# Patient Record
Sex: FEMALE | Race: WHITE | NOT HISPANIC OR LATINO | Employment: FULL TIME | URBAN - METROPOLITAN AREA
[De-identification: names, ages, dates, MRNs, and addresses within clinical notes are randomized per-mention and may not be internally consistent; named-entity substitution may affect disease eponyms.]

---

## 2017-01-03 ENCOUNTER — OFFICE VISIT (OUTPATIENT)
Dept: FAMILY MEDICINE | Facility: CLINIC | Age: 62
End: 2017-01-03
Payer: COMMERCIAL

## 2017-01-03 VITALS
TEMPERATURE: 98 F | HEIGHT: 62 IN | DIASTOLIC BLOOD PRESSURE: 78 MMHG | SYSTOLIC BLOOD PRESSURE: 117 MMHG | WEIGHT: 168.44 LBS | HEART RATE: 71 BPM | BODY MASS INDEX: 31 KG/M2

## 2017-01-03 DIAGNOSIS — J30.1 ALLERGIC RHINITIS DUE TO POLLEN, UNSPECIFIED RHINITIS SEASONALITY: ICD-10-CM

## 2017-01-03 DIAGNOSIS — Z11.59 NEED FOR HEPATITIS C SCREENING TEST: ICD-10-CM

## 2017-01-03 DIAGNOSIS — I10 ESSENTIAL HYPERTENSION: ICD-10-CM

## 2017-01-03 DIAGNOSIS — R06.2 WHEEZING ON AUSCULTATION: ICD-10-CM

## 2017-01-03 DIAGNOSIS — Z00.00 ANNUAL PHYSICAL EXAM: Primary | ICD-10-CM

## 2017-01-03 DIAGNOSIS — E78.5 HYPERLIPIDEMIA, UNSPECIFIED HYPERLIPIDEMIA TYPE: ICD-10-CM

## 2017-01-03 DIAGNOSIS — E03.9 HYPOTHYROIDISM, UNSPECIFIED TYPE: ICD-10-CM

## 2017-01-03 PROCEDURE — 99999 PR PBB SHADOW E&M-EST. PATIENT-LVL III: CPT | Mod: PBBFAC,,, | Performed by: PHYSICIAN ASSISTANT

## 2017-01-03 PROCEDURE — 90471 IMMUNIZATION ADMIN: CPT | Mod: S$GLB,,, | Performed by: INTERNAL MEDICINE

## 2017-01-03 PROCEDURE — 90715 TDAP VACCINE 7 YRS/> IM: CPT | Mod: S$GLB,,, | Performed by: PHYSICIAN ASSISTANT

## 2017-01-03 PROCEDURE — 3074F SYST BP LT 130 MM HG: CPT | Mod: S$GLB,,, | Performed by: PHYSICIAN ASSISTANT

## 2017-01-03 PROCEDURE — 90686 IIV4 VACC NO PRSV 0.5 ML IM: CPT | Mod: S$GLB,,, | Performed by: INTERNAL MEDICINE

## 2017-01-03 PROCEDURE — 3078F DIAST BP <80 MM HG: CPT | Mod: S$GLB,,, | Performed by: PHYSICIAN ASSISTANT

## 2017-01-03 PROCEDURE — 90472 IMMUNIZATION ADMIN EACH ADD: CPT | Mod: S$GLB,,, | Performed by: PHYSICIAN ASSISTANT

## 2017-01-03 PROCEDURE — 99396 PREV VISIT EST AGE 40-64: CPT | Mod: 25,S$GLB,, | Performed by: PHYSICIAN ASSISTANT

## 2017-01-03 RX ORDER — FLUTICASONE PROPIONATE 50 MCG
2 SPRAY, SUSPENSION (ML) NASAL DAILY
Qty: 16 G | Refills: 11 | Status: SHIPPED | OUTPATIENT
Start: 2017-01-03 | End: 2019-10-03

## 2017-01-03 RX ORDER — ALBUTEROL SULFATE 90 UG/1
2 AEROSOL, METERED RESPIRATORY (INHALATION) EVERY 6 HOURS PRN
Qty: 1 INHALER | Refills: 11 | Status: SHIPPED | OUTPATIENT
Start: 2017-01-03 | End: 2019-03-14

## 2017-01-03 RX ORDER — AZELASTINE 1 MG/ML
1 SPRAY, METERED NASAL 2 TIMES DAILY
Qty: 30 ML | Refills: 11 | Status: SHIPPED | OUTPATIENT
Start: 2017-01-03 | End: 2019-10-03

## 2017-01-03 RX ORDER — MOEXIPRIL HYDROCHLORIDE 7.5 MG/1
7.5 TABLET, FILM COATED ORAL DAILY
Qty: 90 TABLET | Refills: 3 | Status: SHIPPED | OUTPATIENT
Start: 2017-01-03 | End: 2017-12-28 | Stop reason: SDUPTHER

## 2017-01-03 RX ORDER — LEVOTHYROXINE SODIUM 25 UG/1
25 TABLET ORAL
Qty: 90 TABLET | Refills: 3 | Status: SHIPPED | OUTPATIENT
Start: 2017-01-03 | End: 2017-03-05 | Stop reason: SDUPTHER

## 2017-01-03 RX ORDER — PRAVASTATIN SODIUM 20 MG/1
20 TABLET ORAL DAILY
Qty: 30 TABLET | Refills: 5 | Status: SHIPPED | OUTPATIENT
Start: 2017-01-03 | End: 2017-02-10 | Stop reason: SDUPTHER

## 2017-01-03 NOTE — MR AVS SNAPSHOT
[facility]  [address]  [place] [address]  Phone: [phone]  Fax: [phone]                  Elodia Serrano   1/3/2017 9:00 AM   Office Visit    Description:  Female : 1955   Provider:  TESSA Wiggins   Department:  [facility]           Reason for Visit     Annual Exam           Diagnoses this Visit        Comments    Annual physical exam    -  Primary     Essential hypertension         Hyperlipidemia, unspecified hyperlipidemia type         Allergic rhinitis due to pollen, unspecified rhinitis seasonality         Hypothyroidism, unspecified type         Wheezing on auscultation         Need for hepatitis C screening test         BMI 30.0-30.9,adult                To Do List           Future Appointments        Provider Department Dept Phone    2017 9:00 AM Jerardo Hedrick Jr., MD [facility] [phone]      Goals (5 Years of Data)     None      Follow-Up and Disposition     Return for Follow-Up with PCP 6 mo .       These Medications        Disp Refills Start End    moexipril (UNIVASC) 7.5 MG Tab 90 tablet 3 1/3/2017     Take 1 tablet (7.5 mg total) by mouth once daily. - Oral    Pharmacy: [facility]/pharmacy #[id number] - [place], [place] - [address] AT [facility] Ph #: [phone]       levothyroxine (SYNTHROID) 25 MCG tablet 90 tablet 3 1/3/2017     Take 1 tablet (25 mcg total) by mouth before breakfast. For thyroid - Oral    Pharmacy: [facility]/pharmacy #[id number] - [place], [place] - [address] AT [facility] Ph #: [phone]       pravastatin (PRAVACHOL) 20 MG tablet 30 tablet 5 1/3/2017     Take 1 tablet (20 mg total) by mouth once daily. - Oral    Pharmacy: [facility]/pharmacy #[id number] - [place], [place] - [address] AT [facility] Ph #: [phone]       fluticasone (FLONASE) 50 mcg/actuation nasal spray 16 g 11 1/3/2017     2 sprays by Each Nare route once daily. - Each Nare     [place] [id number] - [place] - [address] AT [facility] Ph #: [phone]       azelastine (ASTELIN) 137 mcg (0.1 %) nasal spray 30 mL 11 1/3/2017     1 spray (137 mcg total) by Nasal route 2 (two) times daily. - Nasal    Pharmacy: [id number] - [place], [place] - [address] AT [facility] Ph #: [phone]       albuterol 90 mcg/actuation inhaler 1 Inhaler 11 1/3/2017 1/3/2018    Inhale 2 puffs into the lungs every 6 (six) hours as needed (sob, wheezing). - Inhalation    Pharmacy: [id number] - [place], [place] - [address] AT [facility] Ph #: [phone]         PURCHASE these Medications (No prescription required)        Start End    calcium carbonate (CALCIUM ANTACID) 300 mg (750 mg) Chew 1/3/2017     Sig - Route: Take 2 tablets by mouth once daily. chewables - Oral    Class: OTC      [facility]     [facility] On Call Nurse Care Line - 24/7 Assistance  Registered nurses in the [facility] provide clinical advisement, health education, appointment booking, and other advisory services.  Call for this free service at [phone].             Medications           Message regarding Medications     Verify the changes and/or additions to your medication regime listed below are the same as discussed with your clinician today.  If any of these changes or additions are incorrect, please notify your healthcare provider.        CHANGE how you are taking these medications     Start Taking Instead of    pravastatin (PRAVACHOL) 20 MG tablet pravastatin (PRAVACHOL) 20 MG tablet    Dosage:  Take 1 tablet (20 mg total) by mouth once daily. Dosage:  TAKE 1 TABLET BY MOUTH ONCE DAILY.    Reason for Change:  Reorder     calcium carbonate (CALCIUM ANTACID) 300 mg (750 mg) Chew calcium carbonate (CALCIUM CARBONATE) 300 mg (750 mg) Chew    Dosage:  Take 2 tablets by mouth once daily. chewables Dosage:  Take 2 tablets by mouth once  "daily. chewables    Reason for Change:  Reorder            Verify that the below list of medications is an accurate representation of the medications you are currently taking.  If none reported, the list may be blank. If incorrect, please contact your healthcare provider. Carry this list with you in case of emergency.           Current Medications     calcium carbonate (CALCIUM ANTACID) 300 mg (750 mg) Chew Take 2 tablets by mouth once daily. chewables    fluticasone (FLONASE) 50 mcg/actuation nasal spray 2 sprays by Each Nare route once daily.    levothyroxine (SYNTHROID) 25 MCG tablet Take 1 tablet (25 mcg total) by mouth before breakfast. For thyroid    moexipril (UNIVASC) 7.5 MG Tab Take 1 tablet (7.5 mg total) by mouth once daily.    multivitamin (MULTIVITAMIN) per tablet Take 1 tablet by mouth once daily. Women over 50 vitamin    pravastatin (PRAVACHOL) 20 MG tablet Take 1 tablet (20 mg total) by mouth once daily.    albuterol 90 mcg/actuation inhaler Inhale 2 puffs into the lungs every 6 (six) hours as needed (sob, wheezing).    azelastine (ASTELIN) 137 mcg (0.1 %) nasal spray 1 spray (137 mcg total) by Nasal route 2 (two) times daily.           Clinical Reference Information           Vital Signs - Last Recorded  Most recent update: 1/3/2017  9:16 AM by Ambar Momin MA    BP Pulse Temp Ht Wt BMI    117/78 (BP Location: Left arm, Patient Position: Sitting, BP Method: Automatic) 71 98.2 °F (36.8 °C) (Oral) 5' 2" (1.575 m) 76.4 kg (168 lb 6.9 oz) 30.81 kg/m2      Blood Pressure          Most Recent Value    BP  117/78      Allergies as of 1/3/2017     No Known Drug Allergies      Immunizations Administered on Date of Encounter - 1/3/2017     Name Date Dose VIS Date Route    TDAP  Incomplete 0.5 mL 2/24/2015 Intramuscular      Orders Placed During Today's Visit      Normal Orders This Visit    CBC auto differential     Comprehensive metabolic panel     Hepatitis C antibody     Lipid panel     Tdap Vaccine " (Adult)     TSH     Urinalysis     Future Labs/Procedures Expected by Expires    CBC auto differential  1/3/2017 1/3/2018    Comprehensive metabolic panel  1/3/2017 3/4/2018    Hepatitis C antibody  1/3/2017 3/4/2018    Lipid panel  1/3/2017 3/4/2018    TSH  1/3/2017 3/4/2018    Urinalysis  1/3/2017 3/4/2018      [facility] Sign-Up     Activating your [facility] account is as easy as 1-2-3!     1) Visit [email or web address], select Sign Up Now, enter this activation code and your date of birth, then select Next.  [email or web address]  Expires: 2/17/2017  9:58 AM      2) Create a username and password to use when you visit [facility] in the future and select a security question in case you lose your password and select Next.    3) Enter your e-mail address and click Sign Up!    Additional Information  If you have questions, please e-mail [email or web address] or call [phone] to talk to our [facility] staff. Remember, [facility] is NOT to be used for urgent needs. For medical emergencies, dial 911.         Instructions      Controlling High Blood Pressure  High blood pressure (hypertension) is often called the silent killer. This is because many people who have it dont know it. High blood pressure is defined as 140/90 mm Hg or higher. Know your blood pressure and remember to check it regularly. Doing so can save your life. Here are some things you can do to help control your blood pressure.    Choose heart-healthy foods  · Select low-salt, low-fat foods. Limit sodium intake to 2,400 mg per day or the amount suggested by your healthcare provider.  · Limit canned, dried, cured, packaged, and fast foods. These can contain a lot of salt.  · Eat 8 to 10 servings of fruits and vegetables every day.  · Choose lean meats, fish, or chicken.  · Eat whole-grain pasta, brown rice, and beans.  · Eat 2 to 3 servings of low-fat or fat-free dairy products.  · Ask your doctor about the DASH eating plan. This plan helps reduce blood  pressure.  · When you go to a restaurant, ask that your meal be prepared with no added salt.  Maintain a healthy weight  · Ask your healthcare provider how many calories to eat a day. Then stick to that number.  · Ask your healthcare provider what weight range is healthiest for you. If you are overweight, a weight loss of only 3% to 5% of your body weight can help lower blood pressure. Generally, a good weight loss goal is to lose 10% of your body weight in a year.  · Limit snacks and sweets.  · Get regular exercise.  Get up and get active  · Choose activities you enjoy. Find ones you can do with friends or family. This includes bicycling, dancing, walking, and jogging.  · Park farther away from building entrances.  · Use stairs instead of the elevator.  · When you can, walk or bike instead of driving.  · [place] leaves, garden, or do household repairs.  · Be active at a moderate to vigorous level of physical activity for at least 40 minutes for a minimum of 3 to 4 days a week.   Manage stress  · Make time to relax and enjoy life. Find time to laugh.  · Communicate your concerns with your loved ones and your healthcare provider.  · Visit with family and friends, and keep up with hobbies.  Limit alcohol and quit smoking  · Men should have no more than 2 drinks per day.  · Women should have no more than 1 drink per day.  · Talk with your healthcare provider about quitting smoking. Smoking significantly increases your risk for heart disease and stroke. Ask your healthcare provider about community smoking cessation programs and other options.  Medicines  If lifestyle changes arent enough, your healthcare provider may prescribe high blood pressure medicine. Take all medicines as prescribed. If you have any questions about your medicines, ask your healthcare provider before stopping or changing them.   © 2762-9123 [employer]. [address] [place] [address], [place] [address]. All rights reserved. This information is  not intended as a substitute for professional medical care. Always follow your healthcare professional's instructions.        Walking for Fitness  Fitness walking has something for everyone, even people who are already fit. Walking is one of the safest ways to condition your body aerobically. It can boost energy, help you lose weight, and reduce stress.    Physical benefits  · Walking strengthens your heart and lungs, and tones your muscles.  · When walking, your feet land with less impact than in other sports. This reduces chances of muscle, bone, and joint injury.  · Regular walking improves your cholesterol levels and lowers your risk of heart disease. And it helps you control your blood sugar if you have diabetes.  · Walking is a weight-bearing activity, which helps maintain bone density. This can help prevent osteoporosis.  Personal rewards  · Taking walks can help you relax and manage stress. And fitness walking may make you feel better about yourself.  · Walking can help you sleep better at night and make you less likely to be depressed.  · Regular walking may help maintain your memory as you get older.  · Walking is a great way to spend extra time with friends and family members. Be sure to invite your dog along!  Q&A about fitness walking  Q: Will walking keep me fit?  A: Yes. Regular walking at the right pace gives you all the benefits of other aerobic activities, such as jogging and swimming.  Q: Will walking help me lose weight and keep it off?  A: Yes. Per mile, walking can burn as many calories as jogging. Your health care provider can help work walking into your weight-loss plan.  Q: Is walking safe for my health?  A: Yes. Walking is safe if you have high blood pressure, diabetes, heart disease, or other conditions. Talk to your health care provider before you start.  © 7842-0851 [employer]. [address] [place] [address], [place] [address]. All rights reserved. This information is not intended  as a substitute for professional medical care. Always follow your healthcare professional's instructions.        Weight Management: Getting Started  Healthy bodies come in all shapes and sizes. Not all bodies are made to be thin. For some people, a healthy weight is higher than the average weight listed on weight charts. Your healthcare provider can help you decide on a healthy weight for you.    Reasons to lose weight  Losing weight can help with some health problems, such as high blood pressure, heart disease, diabetes, sleep apnea, and arthritis. You may also feel more energy.  Set your long-term goal  Your goal doesn't even have to be a specific weight. You may decide on a fitness goal (such as being able to walk 10 miles a week), or a health goal (such as lowering your blood pressure). Choose a goal that is measurable and reasonable, so you know when you've reached it. A goal of reaching a BMI of less than 25 is not always reasonable (or possible).   Make an action plan  Habits dont change overnight. Setting your goals too high can leave you feeling discouraged if you cant reach them. Be realistic. Choose one or two small changes you can make now. Set an action plan for how you are going to make these changes. When you can stick to this plan, keep making a few more small changes. Taking small steps will help you stay on the path to success.  Track your progress  Write down your goals. Then, keep a daily record of your progress. Write down what you eat and how active you are. This record lets you look back on how much youve done. It may also help when youre feeling frustrated. Reward yourself for success. Even if you dont reach every goal, give yourself credit for what you do get done.  Get support  Encouragement from others can help make losing weight easier. Ask your family members and friends for support. They may even want to join you. Also look to your healthcare provider, registered dietitian, and   for help. Your local hospital can give you more information about nutrition, exercise, and weight loss.  © 2959-2389 [employer]. [address] [place] [address], [place] [address]. All rights reserved. This information is not intended as a substitute for professional medical care. Always follow your healthcare professional's instructions.

## 2017-01-03 NOTE — PROGRESS NOTES
Subjective:       Patient ID: Elodia Serrano is a 61 y.o. female.    Chief Complaint: Annual Exam    HPI Comments: Patient presents for annual exam.  She has controlled hypertension controlled with ace inhibitor.  .  She take statin for hyperlipidemia and levothyroxine for hypothyroidism.  She is due for lab.  She has allergic rhinitis and allergy induced asthma.  Symptoms are well controlled with intranasal steroid and antihistamine.  She uses albuterol rarely for wheezing.     Review of Systems   Constitutional: Negative for appetite change, fatigue and unexpected weight change.   Eyes: Positive for visual disturbance.   Respiratory: Negative for cough, shortness of breath and wheezing.    Cardiovascular: Negative for chest pain, palpitations and leg swelling.   Gastrointestinal: Negative for abdominal distention, abdominal pain, anal bleeding, blood in stool, constipation and diarrhea.   Endocrine: Negative for polydipsia and polyuria.   Neurological: Negative for dizziness, light-headedness and headaches.       Objective:      Physical Exam   Constitutional: She appears well-developed and well-nourished. She is cooperative. No distress.   Eyes: Conjunctivae and EOM are normal. Pupils are equal, round, and reactive to light. No scleral icterus.   Cardiovascular: Normal rate, regular rhythm and normal heart sounds.    Pulmonary/Chest: Effort normal and breath sounds normal.   Abdominal: Soft. Bowel sounds are normal.   Musculoskeletal:        Right lower leg: She exhibits no edema.        Left lower leg: She exhibits no edema.   Neurological: She is alert.   Skin: Skin is warm and dry.   Psychiatric: She has a normal mood and affect. Her speech is normal. Judgment normal. Cognition and memory are normal.   Vitals reviewed.      Assessment:       1. Annual physical exam    2. Essential hypertension    3. Hyperlipidemia, unspecified hyperlipidemia type    4. Allergic rhinitis due to pollen, unspecified rhinitis  seasonality    5. Hypothyroidism, unspecified type    6. wheezing    7. Need for hepatitis C screening test    8. BMI 30.0-30.9,adult        Plan:       Elodia was seen today for annual exam.    Diagnoses and all orders for this visit:    Annual exam   She has had colonoscopy with Dr Norman this year.  Request copy for  update  PAP, WWE and mammogram per Dr Dale   Continue healthy diet and exercise  Up date vaccines and lab today    Essential hypertension  -     moexipril (UNIVASC) 7.5 MG Tab; Take 1 tablet (7.5 mg total) by mouth once daily.  -     Comprehensive metabolic panel; Future  -     Lipid panel; Future  -     CBC auto differential; Future  -     Urinalysis; Future  -     Hyperlipidemia, unspecified hyperlipidemia type  -     pravastatin (PRAVACHOL) 20 MG tablet; Take 1 tablet (20 mg total) by mouth once daily.  -     Lipid panel; Future  -    Allergic rhinitis due to pollen, unspecified rhinitis seasonality  -     azelastine (ASTELIN) 137 mcg (0.1 %) nasal spray; 1 spray (137 mcg total) by Nasal route 2 (two) times daily.    fluticasone (FLONASE) 50 mcg/actuation nasal spray; 2 sprays by Each Nare route once daily.    Hypothyroidism, unspecified type  -     -     TSH       levothyroxine (SYNTHROID) 25 MCG tablet; Take 1 tablet (25 mcg total) by mouth before breakfast. For thyroid    wheezing  -     albuterol 90 mcg/actuation inhaler; Inhale 2 puffs into the lungs every 6 (six) hours as needed (sob, wheezing).    Need for hepatitis C screening test  -     Hepatitis C antibody; Future  -    BMI 30.0-30.9,adult  Patient readiness: acceptance and barriers:none    During the course of the visit the patient was educated and counseled about the following:     Hypertension:   Medication: no change.  Obesity:   General weight loss/lifestyle modification strategies discussed (elicit support from others; identify saboteurs; non-food rewards, etc).    Goals: Hypertension: Reduce Blood Pressure and Obesity:  Reduce calorie intake and BMI    Did patient meet goals/outcomes: Yes    The following self management tools provided: declined    Patient Instructions (the written plan) was given to the patient/family.     Time spent with patient: 30 minutes            Other orders  -     Tdap Vaccine (Adult)  --   -     calcium carbonate (CALCIUM ANTACID) 300 mg (750 mg) Chew; Take 2 tablets by mouth once daily. chewables

## 2017-01-03 NOTE — PATIENT INSTRUCTIONS
Controlling High Blood Pressure  High blood pressure (hypertension) is often called the silent killer. This is because many people who have it dont know it. High blood pressure is defined as 140/90 mm Hg or higher. Know your blood pressure and remember to check it regularly. Doing so can save your life. Here are some things you can do to help control your blood pressure.    Choose heart-healthy foods  · Select low-salt, low-fat foods. Limit sodium intake to 2,400 mg per day or the amount suggested by your healthcare provider.  · Limit canned, dried, cured, packaged, and fast foods. These can contain a lot of salt.  · Eat 8 to 10 servings of fruits and vegetables every day.  · Choose lean meats, fish, or chicken.  · Eat whole-grain pasta, brown rice, and beans.  · Eat 2 to 3 servings of low-fat or fat-free dairy products.  · Ask your doctor about the DASH eating plan. This plan helps reduce blood pressure.  · When you go to a restaurant, ask that your meal be prepared with no added salt.  Maintain a healthy weight  · Ask your healthcare provider how many calories to eat a day. Then stick to that number.  · Ask your healthcare provider what weight range is healthiest for you. If you are overweight, a weight loss of only 3% to 5% of your body weight can help lower blood pressure. Generally, a good weight loss goal is to lose 10% of your body weight in a year.  · Limit snacks and sweets.  · Get regular exercise.  Get up and get active  · Choose activities you enjoy. Find ones you can do with friends or family. This includes bicycling, dancing, walking, and jogging.  · Park farther away from building entrances.  · Use stairs instead of the elevator.  · When you can, walk or bike instead of driving.  · [place] leaves, garden, or do household repairs.  · Be active at a moderate to vigorous level of physical activity for at least 40 minutes for a minimum of 3 to 4 days a week.   Manage stress  · Make time to relax and enjoy  life. Find time to laugh.  · Communicate your concerns with your loved ones and your healthcare provider.  · Visit with family and friends, and keep up with hobbies.  Limit alcohol and quit smoking  · Men should have no more than 2 drinks per day.  · Women should have no more than 1 drink per day.  · Talk with your healthcare provider about quitting smoking. Smoking significantly increases your risk for heart disease and stroke. Ask your healthcare provider about community smoking cessation programs and other options.  Medicines  If lifestyle changes arent enough, your healthcare provider may prescribe high blood pressure medicine. Take all medicines as prescribed. If you have any questions about your medicines, ask your healthcare provider before stopping or changing them.   © 4422-0925 [employer]. [address] [place] [address], [place] [address]. All rights reserved. This information is not intended as a substitute for professional medical care. Always follow your healthcare professional's instructions.        Walking for Fitness  Fitness walking has something for everyone, even people who are already fit. Walking is one of the safest ways to condition your body aerobically. It can boost energy, help you lose weight, and reduce stress.    Physical benefits  · Walking strengthens your heart and lungs, and tones your muscles.  · When walking, your feet land with less impact than in other sports. This reduces chances of muscle, bone, and joint injury.  · Regular walking improves your cholesterol levels and lowers your risk of heart disease. And it helps you control your blood sugar if you have diabetes.  · Walking is a weight-bearing activity, which helps maintain bone density. This can help prevent osteoporosis.  Personal rewards  · Taking walks can help you relax and manage stress. And fitness walking may make you feel better about yourself.  · Walking can help you sleep better at night and make you less  likely to be depressed.  · Regular walking may help maintain your memory as you get older.  · Walking is a great way to spend extra time with friends and family members. Be sure to invite your dog along!  Q&A about fitness walking  Q: Will walking keep me fit?  A: Yes. Regular walking at the right pace gives you all the benefits of other aerobic activities, such as jogging and swimming.  Q: Will walking help me lose weight and keep it off?  A: Yes. Per mile, walking can burn as many calories as jogging. Your health care provider can help work walking into your weight-loss plan.  Q: Is walking safe for my health?  A: Yes. Walking is safe if you have high blood pressure, diabetes, heart disease, or other conditions. Talk to your health care provider before you start.  © 2177-3256 [employer]. [address] [place] [address]. All rights reserved. This information is not intended as a substitute for professional medical care. Always follow your healthcare professional's instructions.        Weight Management: Getting Started  Healthy bodies come in all shapes and sizes. Not all bodies are made to be thin. For some people, a healthy weight is higher than the average weight listed on weight charts. Your healthcare provider can help you decide on a healthy weight for you.    Reasons to lose weight  Losing weight can help with some health problems, such as high blood pressure, heart disease, diabetes, sleep apnea, and arthritis. You may also feel more energy.  Set your long-term goal  Your goal doesn't even have to be a specific weight. You may decide on a fitness goal (such as being able to walk 10 miles a week), or a health goal (such as lowering your blood pressure). Choose a goal that is measurable and reasonable, so you know when you've reached it. A goal of reaching a BMI of less than 25 is not always reasonable (or possible).   Make an action plan  Habits dont change overnight. Setting your  goals too high can leave you feeling discouraged if you cant reach them. Be realistic. Choose one or two small changes you can make now. Set an action plan for how you are going to make these changes. When you can stick to this plan, keep making a few more small changes. Taking small steps will help you stay on the path to success.  Track your progress  Write down your goals. Then, keep a daily record of your progress. Write down what you eat and how active you are. This record lets you look back on how much youve done. It may also help when youre feeling frustrated. Reward yourself for success. Even if you dont reach every goal, give yourself credit for what you do get done.  Get support  Encouragement from others can help make losing weight easier. Ask your family members and friends for support. They may even want to join you. Also look to your healthcare provider, registered dietitian, and  for help. Your local hospital can give you more information about nutrition, exercise, and weight loss.  © 4834-1122 [employer]. [address] [place] [address], [place] [address]. All rights reserved. This information is not intended as a substitute for professional medical care. Always follow your healthcare professional's instructions.

## 2017-02-10 DIAGNOSIS — E78.5 HYPERLIPIDEMIA, UNSPECIFIED HYPERLIPIDEMIA TYPE: ICD-10-CM

## 2017-02-10 RX ORDER — PRAVASTATIN SODIUM 20 MG/1
20 TABLET ORAL DAILY
Qty: 90 TABLET | Refills: 3 | Status: SHIPPED | OUTPATIENT
Start: 2017-02-10 | End: 2017-03-14

## 2017-02-10 NOTE — TELEPHONE ENCOUNTER
----- Message from Yaritza Parham sent at 2/10/2017  1:06 PM CST -----  [facility]  Requesting 90 day supply on Prevastatin. Please call back at     [facility]/pharmacy #[id number] - [place] - [address] AT [facility]  [address]  [place] [address]  Phone: [phone] Fax: [phone]

## 2017-03-05 DIAGNOSIS — E03.9 HYPOTHYROIDISM: ICD-10-CM

## 2017-03-05 RX ORDER — LEVOTHYROXINE SODIUM 25 UG/1
TABLET ORAL
Qty: 90 TABLET | Refills: 0 | Status: SHIPPED | OUTPATIENT
Start: 2017-03-05 | End: 2017-06-23 | Stop reason: SDUPTHER

## 2017-03-10 ENCOUNTER — TELEPHONE (OUTPATIENT)
Dept: FAMILY MEDICINE | Facility: CLINIC | Age: 62
End: 2017-03-10

## 2017-03-10 DIAGNOSIS — R73.9 HYPERGLYCEMIA: Primary | ICD-10-CM

## 2017-03-10 DIAGNOSIS — E78.5 HYPERLIPIDEMIA, UNSPECIFIED HYPERLIPIDEMIA TYPE: ICD-10-CM

## 2017-03-10 LAB
ALBUMIN SERPL-MCNC: 4.7 G/DL (ref 3.6–5.1)
ALBUMIN/GLOB SERPL: 1.8 (CALC) (ref 1–2.5)
ALP SERPL-CCNC: 58 U/L (ref 33–130)
ALT SERPL-CCNC: 18 U/L (ref 6–29)
AST SERPL-CCNC: 16 U/L (ref 10–35)
BASOPHILS # BLD AUTO: 48 CELLS/UL (ref 0–200)
BASOPHILS NFR BLD AUTO: 0.7 %
BILIRUB SERPL-MCNC: 0.7 MG/DL (ref 0.2–1.2)
BUN SERPL-MCNC: 16 MG/DL (ref 7–25)
BUN/CREAT SERPL: ABNORMAL (CALC) (ref 6–22)
CALCIUM SERPL-MCNC: 9.8 MG/DL (ref 8.6–10.4)
CHLORIDE SERPL-SCNC: 103 MMOL/L (ref 98–110)
CHOLEST SERPL-MCNC: 242 MG/DL (ref 125–200)
CHOLEST/HDLC SERPL: 4.6 (CALC)
CO2 SERPL-SCNC: 28 MMOL/L (ref 20–31)
CREAT SERPL-MCNC: 0.72 MG/DL (ref 0.5–1.05)
EOSINOPHIL # BLD AUTO: 143 CELLS/UL (ref 15–500)
EOSINOPHIL NFR BLD AUTO: 2.1 %
ERYTHROCYTE [DISTWIDTH] IN BLOOD BY AUTOMATED COUNT: 12.9 % (ref 11–15)
GFR SERPL CREATININE-BSD FRML MDRD: 92 ML/MIN/1.73M2
GLOBULIN SER CALC-MCNC: 2.6 G/DL (CALC) (ref 1.9–3.7)
GLUCOSE SERPL-MCNC: 112 MG/DL (ref 65–99)
HCT VFR BLD AUTO: 41.2 % (ref 35–45)
HCV AB S/CO SERPL IA: 0.02
HCV AB SERPL QL IA: NORMAL
HDLC SERPL-MCNC: 53 MG/DL
HGB BLD-MCNC: 13.8 G/DL (ref 11.7–15.5)
LDLC SERPL CALC-MCNC: 144 MG/DL (CALC)
LYMPHOCYTES # BLD AUTO: 1714 CELLS/UL (ref 850–3900)
LYMPHOCYTES NFR BLD AUTO: 25.2 %
MCH RBC QN AUTO: 30.6 PG (ref 27–33)
MCHC RBC AUTO-ENTMCNC: 33.6 G/DL (ref 32–36)
MCV RBC AUTO: 91.1 FL (ref 80–100)
MONOCYTES # BLD AUTO: 394 CELLS/UL (ref 200–950)
MONOCYTES NFR BLD AUTO: 5.8 %
NEUTROPHILS # BLD AUTO: 4502 CELLS/UL (ref 1500–7800)
NEUTROPHILS NFR BLD AUTO: 66.2 %
NONHDLC SERPL-MCNC: 189 MG/DL (CALC)
PLATELET # BLD AUTO: 242 THOUSAND/UL (ref 140–400)
PMV BLD REES-ECKER: 8.9 FL (ref 7.5–12.5)
POTASSIUM SERPL-SCNC: 4.3 MMOL/L (ref 3.5–5.3)
PROT SERPL-MCNC: 7.3 G/DL (ref 6.1–8.1)
RBC # BLD AUTO: 4.52 MILLION/UL (ref 3.8–5.1)
SODIUM SERPL-SCNC: 140 MMOL/L (ref 135–146)
TRIGL SERPL-MCNC: 227 MG/DL
TSH SERPL-ACNC: 1.74 MIU/L (ref 0.4–4.5)
WBC # BLD AUTO: 6.8 THOUSAND/UL (ref 3.8–10.8)

## 2017-03-10 NOTE — TELEPHONE ENCOUNTER
Ok the orders have been send   A1C now   Lipid and cmp in 3 months (she should make sure [facility] knows this when she goes)

## 2017-03-13 ENCOUNTER — TELEPHONE (OUTPATIENT)
Dept: FAMILY MEDICINE | Facility: CLINIC | Age: 62
End: 2017-03-13

## 2017-03-13 DIAGNOSIS — R73.9 HYPERGLYCEMIA: Primary | ICD-10-CM

## 2017-03-13 NOTE — TELEPHONE ENCOUNTER
"[facility] will not accept a "split" requisition. States orders cannot be linked together. Please send A1C order now and cancel Lipid and CMP  "

## 2017-03-14 ENCOUNTER — TELEPHONE (OUTPATIENT)
Dept: FAMILY MEDICINE | Facility: CLINIC | Age: 62
End: 2017-03-14

## 2017-03-14 LAB — HBA1C MFR BLD: 6 % OF TOTAL HGB

## 2017-03-14 RX ORDER — PRAVASTATIN SODIUM 40 MG/1
40 TABLET ORAL DAILY
Qty: 90 TABLET | Refills: 3 | Status: SHIPPED | OUTPATIENT
Start: 2017-03-14 | End: 2018-02-15 | Stop reason: SDUPTHER

## 2017-03-14 NOTE — TELEPHONE ENCOUNTER
"Please let patient know that her lab showed elevated glucose with A1C of 6.0- this is considered "pre diabetes"  Recommend starting diabetic diet to lower blood sugar and avoid/delay the progression into diabetes    Her cholesterol is not at goal   Recommend increasing Pravachol and repeating in 3 months     Thyroid at goal     Hep c negative     Follow up as scheduled   "

## 2017-03-16 NOTE — TELEPHONE ENCOUNTER
Reviewed results and plan with patient; voices understanding; mailed diabetic information; scheduled lab appointment. [facility] mailed

## 2017-05-08 ENCOUNTER — TELEPHONE (OUTPATIENT)
Dept: FAMILY MEDICINE | Facility: CLINIC | Age: 62
End: 2017-05-08

## 2017-05-08 DIAGNOSIS — I10 ESSENTIAL HYPERTENSION: Primary | ICD-10-CM

## 2017-05-08 DIAGNOSIS — E78.2 MIXED HYPERLIPIDEMIA: ICD-10-CM

## 2017-05-08 NOTE — TELEPHONE ENCOUNTER
Patient was rescheduled for her 6 month f/u with you on 7/20/117 to Georgie Redmond PA-C for the same day.  She is requesting lab orders be put in for [facility].

## 2017-05-15 PROBLEM — E78.2 MIXED HYPERLIPIDEMIA: Status: ACTIVE | Noted: 2017-05-15

## 2017-05-15 NOTE — TELEPHONE ENCOUNTER
Patient informed, verbalized understanding.  Orders are ready.  She can either pick them up or get us a fax number and we can fax it to them.  She states she will call back with a number.

## 2017-05-16 NOTE — TELEPHONE ENCOUNTER
Patient states she's coming to [place] tomorrow and will pick them up in the morning. Informed her we will put them at the .

## 2017-06-22 LAB
ALBUMIN SERPL-MCNC: 4.4 G/DL (ref 3.6–5.1)
ALBUMIN/GLOB SERPL: 2 (CALC) (ref 1–2.5)
ALP SERPL-CCNC: 54 U/L (ref 33–130)
ALT SERPL-CCNC: 17 U/L (ref 6–29)
AST SERPL-CCNC: 14 U/L (ref 10–35)
BILIRUB SERPL-MCNC: 0.5 MG/DL (ref 0.2–1.2)
BUN SERPL-MCNC: 13 MG/DL (ref 7–25)
BUN/CREAT SERPL: NORMAL (CALC) (ref 6–22)
CALCIUM SERPL-MCNC: 9.4 MG/DL (ref 8.6–10.4)
CHLORIDE SERPL-SCNC: 105 MMOL/L (ref 98–110)
CO2 SERPL-SCNC: 29 MMOL/L (ref 20–31)
COPY(IES) SENT TO:: NORMAL
CREAT SERPL-MCNC: 0.72 MG/DL (ref 0.5–1.05)
GFR SERPL CREATININE-BSD FRML MDRD: 92 ML/MIN/1.73M2
GLOBULIN SER CALC-MCNC: 2.2 G/DL (CALC) (ref 1.9–3.7)
GLUCOSE SERPL-MCNC: 99 MG/DL (ref 65–99)
HBA1C MFR BLD: 5.6 % OF TOTAL HGB
POTASSIUM SERPL-SCNC: 4.2 MMOL/L (ref 3.5–5.3)
PROT SERPL-MCNC: 6.6 G/DL (ref 6.1–8.1)
SODIUM SERPL-SCNC: 139 MMOL/L (ref 135–146)

## 2017-06-23 DIAGNOSIS — E03.9 HYPOTHYROIDISM: ICD-10-CM

## 2017-06-24 RX ORDER — LEVOTHYROXINE SODIUM 25 UG/1
TABLET ORAL
Qty: 90 TABLET | Refills: 3 | Status: SHIPPED | OUTPATIENT
Start: 2017-06-24 | End: 2018-02-15 | Stop reason: SDUPTHER

## 2017-07-19 ENCOUNTER — DOCUMENTATION ONLY (OUTPATIENT)
Dept: FAMILY MEDICINE | Facility: CLINIC | Age: 62
End: 2017-07-19

## 2017-07-19 NOTE — PROGRESS NOTES
Pre-Visit Chart Review  For Appointment Scheduled on 07/20/2017    Health Maintenance Due   Topic Date Due    Pap Smear with HPV Cotest  06/03/2016    Mammogram  08/24/2017

## 2017-07-20 ENCOUNTER — OFFICE VISIT (OUTPATIENT)
Dept: FAMILY MEDICINE | Facility: CLINIC | Age: 62
End: 2017-07-20
Payer: COMMERCIAL

## 2017-07-20 VITALS
HEART RATE: 71 BPM | DIASTOLIC BLOOD PRESSURE: 73 MMHG | TEMPERATURE: 98 F | BODY MASS INDEX: 30.55 KG/M2 | WEIGHT: 166 LBS | SYSTOLIC BLOOD PRESSURE: 122 MMHG | HEIGHT: 62 IN

## 2017-07-20 DIAGNOSIS — I10 ESSENTIAL HYPERTENSION: Primary | ICD-10-CM

## 2017-07-20 DIAGNOSIS — E03.9 ACQUIRED HYPOTHYROIDISM: ICD-10-CM

## 2017-07-20 DIAGNOSIS — E78.2 MIXED HYPERLIPIDEMIA: ICD-10-CM

## 2017-07-20 DIAGNOSIS — R73.03 PRE-DIABETES: ICD-10-CM

## 2017-07-20 PROCEDURE — 99999 PR PBB SHADOW E&M-EST. PATIENT-LVL IV: CPT | Mod: PBBFAC,,, | Performed by: PHYSICIAN ASSISTANT

## 2017-07-20 PROCEDURE — 99213 OFFICE O/P EST LOW 20 MIN: CPT | Mod: S$GLB,,, | Performed by: PHYSICIAN ASSISTANT

## 2017-07-20 NOTE — PATIENT INSTRUCTIONS
Weight Management: Getting Started  Healthy bodies come in all shapes and sizes. Not all bodies are made to be thin. For some people, a healthy weight is higher than the average weight listed on weight charts. Your healthcare provider can help you decide on a healthy weight for you.    Reasons to lose weight  Losing weight can help with some health problems, such as high blood pressure, heart disease, diabetes, sleep apnea, and arthritis. You may also feel more energy.  Set your long-term goal  Your goal doesn't even have to be a specific weight. You may decide on a fitness goal (such as being able to walk 10 miles a week), or a health goal (such as lowering your blood pressure). Choose a goal that is measurable and reasonable, so you know when you've reached it. A goal of reaching a BMI of less than 25 is not always reasonable (or possible).   Make an action plan  Habits dont change overnight. Setting your goals too high can leave you feeling discouraged if you cant reach them. Be realistic. Choose one or two small changes you can make now. Set an action plan for how you are going to make these changes. When you can stick to this plan, keep making a few more small changes. Taking small steps will help you stay on the path to success.  Track your progress  Write down your goals. Then, keep a daily record of your progress. Write down what you eat and how active you are. This record lets you look back on how much youve done. It may also help when youre feeling frustrated. Reward yourself for success. Even if you dont reach every goal, give yourself credit for what you do get done.  Get support  Encouragement from others can help make losing weight easier. Ask your family members and friends for support. They may even want to join you. Also look to your healthcare provider, registered dietitian, and  for help. Your local hospital can give you more information about nutrition, exercise, and  weight loss.  Date Last Reviewed: 1/31/2016 © 2000-2016 [employer]. [address] [place] [address], [place] [address]. All rights reserved. This information is not intended as a substitute for professional medical care. Always follow your healthcare professional's instructions.        Walking for Fitness  Fitness walking has something for everyone, even people who are already fit. Walking is one of the safest ways to condition your body aerobically. It can boost energy, help you lose weight, and reduce stress.    Physical benefits  · Walking strengthens your heart and lungs, and tones your muscles.  · When walking, your feet land with less impact than in other sports. This reduces chances of muscle, bone, and joint injury.  · Regular walking improves your cholesterol levels and lowers your risk of heart disease. And it helps you control your blood sugar if you have diabetes.  · Walking is a weight-bearing activity, which helps maintain bone density. This can help prevent osteoporosis.  Personal rewards  · Taking walks can help you relax and manage stress. And fitness walking may make you feel better about yourself.  · Walking can help you sleep better at night and make you less likely to be depressed.  · Regular walking may help maintain your memory as you get older.  · Walking is a great way to spend extra time with friends and family members. Be sure to invite your dog along!  Q&A about fitness walking  Q: Will walking keep me fit?  A: Yes. Regular walking at the right pace gives you all the benefits of other aerobic activities, such as jogging and swimming.  Q: Will walking help me lose weight and keep it off?  A: Yes. Per mile, walking can burn as many calories as jogging. Your health care provider can help work walking into your weight-loss plan.  Q: Is walking safe for my health?  A: Yes. Walking is safe if you have high blood pressure, diabetes, heart disease, or other conditions. Talk to your health  care provider before you start.  Date Last Reviewed: 5/9/2015  © 4211-5971 [employer]. [address] [place] [address], [place] [address]. All rights reserved. This information is not intended as a substitute for professional medical care. Always follow your healthcare professional's instructions.        Controlling High Blood Pressure  High blood pressure (hypertension) is often called the silent killer. This is because many people who have it dont know it. High blood pressure is defined as 140/90 mm Hg or higher. Know your blood pressure and remember to check it regularly. Doing so can save your life. Here are some things you can do to help control your blood pressure.    Choose heart-healthy foods  · Select low-salt, low-fat foods. Limit sodium intake to 2,400 mg per day or the amount suggested by your healthcare provider.  · Limit canned, dried, cured, packaged, and fast foods. These can contain a lot of salt.  · Eat 8 to 10 servings of fruits and vegetables every day.  · Choose lean meats, fish, or chicken.  · Eat whole-grain pasta, brown rice, and beans.  · Eat 2 to 3 servings of low-fat or fat-free dairy products.  · Ask your doctor about the DASH eating plan. This plan helps reduce blood pressure.  · When you go to a restaurant, ask that your meal be prepared with no added salt.  Maintain a healthy weight  · Ask your healthcare provider how many calories to eat a day. Then stick to that number.  · Ask your healthcare provider what weight range is healthiest for you. If you are overweight, a weight loss of only 3% to 5% of your body weight can help lower blood pressure. Generally, a good weight loss goal is to lose 10% of your body weight in a year.  · Limit snacks and sweets.  · Get regular exercise.  Get up and get active  · Choose activities you enjoy. Find ones you can do with friends or family. This includes bicycling, dancing, walking, and jogging.  · Park farther away from building  entrances.  · Use stairs instead of the elevator.  · When you can, walk or bike instead of driving.  · [place] leaves, garden, or do household repairs.  · Be active at a moderate to vigorous level of physical activity for at least 40 minutes for a minimum of 3 to 4 days a week.   Manage stress  · Make time to relax and enjoy life. Find time to laugh.  · Communicate your concerns with your loved ones and your healthcare provider.  · Visit with family and friends, and keep up with hobbies.  Limit alcohol and quit smoking  · Men should have no more than 2 drinks per day.  · Women should have no more than 1 drink per day.  · Talk with your healthcare provider about quitting smoking. Smoking significantly increases your risk for heart disease and stroke. Ask your healthcare provider about community smoking cessation programs and other options.  Medicines  If lifestyle changes arent enough, your healthcare provider may prescribe high blood pressure medicine. Take all medicines as prescribed. If you have any questions about your medicines, ask your healthcare provider before stopping or changing them.   Date Last Reviewed: 4/27/2016 © 2000-2016 [employer]. [address] [place] [address], [place] [address]. All rights reserved. This information is not intended as a substitute for professional medical care. Always follow your healthcare professional's instructions.

## 2017-07-20 NOTE — PROGRESS NOTES
Subjective:       Patient ID: Elodia Serrano is a 61 y.o. female.    Chief Complaint: Follow-up    Patient presents for follow up of controlled diabetes, hyperlipidemia, hypothyroidism, asthma and pre diabetes.  She eliminated drinking tea with sugar and a1c is improved form 6.0 to 5.6.  She has no complaints today   He is up to date with health maintaninece       Review of Systems   Constitutional: Negative for appetite change, fatigue and unexpected weight change.   Respiratory: Negative for cough, chest tightness, shortness of breath and wheezing.    Cardiovascular: Negative for chest pain, palpitations and leg swelling.   Endocrine: Negative for polydipsia and polyuria.   Musculoskeletal: Negative for myalgias.   Neurological: Negative for dizziness, light-headedness and headaches.       Objective:      Physical Exam   Constitutional: She appears well-developed and well-nourished. She is cooperative. No distress.   Eyes: Conjunctivae and EOM are normal. Pupils are equal, round, and reactive to light. No scleral icterus.   Cardiovascular: Normal rate, regular rhythm and normal heart sounds.    Pulmonary/Chest: Effort normal and breath sounds normal.   Abdominal: Soft. Bowel sounds are normal.   Musculoskeletal:        Right lower leg: She exhibits no edema.        Left lower leg: She exhibits no edema.   Neurological: She is alert.   Skin: Skin is warm and dry.   Psychiatric: She has a normal mood and affect. Her speech is normal. Judgment normal. Cognition and memory are normal.   Vitals reviewed.      Assessment:       1. Essential hypertension    2. Mixed hyperlipidemia    3. Acquired hypothyroidism    4. Pre-diabetes    5. BMI 30.0-30.9,adult        Plan:       Elodia was seen today for follow-up.    Diagnoses and all orders for this visit:    Essential hypertension, at goal   Continue as is   Mixed hyperlipidemia  Repeat in 6 mop   Acquired hypothyroidism  Repeat in 6 mo   Continue as is    Pre-diabetes  Glucose improved with elimination of sweet tea  Continue as is   BMI 30.0-30.9,adult    Patient readiness: eager and barriers:none    During the course of the visit the patient was educated and counseled about the following:     Hypertension:   Medication: no change.  Obesity:   Diet interventions: qualitative changes (increase low-fat,  high-fiber foods).    Goals: Hypertension: Reduce Blood Pressure and Obesity: Reduce calorie intake and BMI    Did patient meet goals/outcomes: Yes    The following self management tools provided: blood pressure log    Patient Instructions (the written plan) was given to the patient/family.     Time spent with patient: 15 minutes

## 2017-12-28 DIAGNOSIS — I10 ESSENTIAL HYPERTENSION: ICD-10-CM

## 2017-12-28 RX ORDER — MOEXIPRIL HYDROCHLORIDE 7.5 MG/1
7.5 TABLET, FILM COATED ORAL DAILY
Qty: 90 TABLET | Refills: 3 | Status: SHIPPED | OUTPATIENT
Start: 2017-12-28 | End: 2019-02-04 | Stop reason: SDUPTHER

## 2017-12-28 NOTE — TELEPHONE ENCOUNTER
----- Message from Sue Barry sent at 12/28/2017  1:38 PM CST -----  Contact: Nara Clifton, patient [phone] calling for a refill on moexipril (UNIVASC) 7.5 MG Tab, patient had it filled in November however she cannot find the bottle, possibly threw it away. Patient has 28 tablets left- will need one month supply. Please advise. Thanks.    [facility]/pharmacy #[id number] [address]  [place] [address]  Phone: [phone] Fax: [phone]

## 2018-02-12 ENCOUNTER — DOCUMENTATION ONLY (OUTPATIENT)
Dept: FAMILY MEDICINE | Facility: CLINIC | Age: 63
End: 2018-02-12

## 2018-02-12 NOTE — PROGRESS NOTES
Pre-Visit Chart Review  For Appointment Scheduled on (date) 2/15/18    Health Maintenance Due   Topic Date Due    Pap Smear with HPV Cotest  06/03/2016    Influenza Vaccine  08/01/2017    Mammogram  08/24/2017    Zoster Vaccine  10/18/2017

## 2018-02-15 ENCOUNTER — OFFICE VISIT (OUTPATIENT)
Dept: FAMILY MEDICINE | Facility: CLINIC | Age: 63
End: 2018-02-15
Payer: COMMERCIAL

## 2018-02-15 VITALS
BODY MASS INDEX: 30.96 KG/M2 | RESPIRATION RATE: 18 BRPM | WEIGHT: 164 LBS | DIASTOLIC BLOOD PRESSURE: 74 MMHG | HEART RATE: 69 BPM | SYSTOLIC BLOOD PRESSURE: 134 MMHG | HEIGHT: 61 IN | TEMPERATURE: 98 F

## 2018-02-15 DIAGNOSIS — E03.9 ACQUIRED HYPOTHYROIDISM: ICD-10-CM

## 2018-02-15 DIAGNOSIS — E78.2 MIXED HYPERLIPIDEMIA: ICD-10-CM

## 2018-02-15 DIAGNOSIS — I10 ESSENTIAL HYPERTENSION: Primary | ICD-10-CM

## 2018-02-15 DIAGNOSIS — J45.998 ASTHMA IN REMISSION: ICD-10-CM

## 2018-02-15 PROCEDURE — 90686 IIV4 VACC NO PRSV 0.5 ML IM: CPT | Mod: S$GLB,,, | Performed by: FAMILY MEDICINE

## 2018-02-15 PROCEDURE — 99213 OFFICE O/P EST LOW 20 MIN: CPT | Mod: 25,S$GLB,, | Performed by: FAMILY MEDICINE

## 2018-02-15 PROCEDURE — 3008F BODY MASS INDEX DOCD: CPT | Mod: S$GLB,,, | Performed by: FAMILY MEDICINE

## 2018-02-15 PROCEDURE — 99999 PR PBB SHADOW E&M-EST. PATIENT-LVL IV: CPT | Mod: PBBFAC,,, | Performed by: FAMILY MEDICINE

## 2018-02-15 PROCEDURE — 90471 IMMUNIZATION ADMIN: CPT | Mod: S$GLB,,, | Performed by: FAMILY MEDICINE

## 2018-02-15 RX ORDER — SALICYLIC ACID 6 G/100G
AEROSOL, FOAM TOPICAL
COMMUNITY
Start: 2017-08-15 | End: 2021-08-26

## 2018-02-15 RX ORDER — LEVOTHYROXINE SODIUM 25 UG/1
TABLET ORAL
Qty: 90 TABLET | Refills: 3 | Status: SHIPPED | OUTPATIENT
Start: 2018-02-15 | End: 2019-04-15 | Stop reason: SDUPTHER

## 2018-02-15 RX ORDER — PRAVASTATIN SODIUM 40 MG/1
40 TABLET ORAL DAILY
Qty: 90 TABLET | Refills: 3 | Status: SHIPPED | OUTPATIENT
Start: 2018-02-15 | End: 2019-03-13 | Stop reason: SDUPTHER

## 2018-02-15 RX ORDER — IBANDRONATE SODIUM 150 MG/1
1 TABLET, FILM COATED ORAL
COMMUNITY
Start: 2017-12-28 | End: 2023-04-13

## 2018-02-15 RX ORDER — CALCIPOTRIENE, BETAMETHASONE DIPROPIONATE 50; .643 UG/G; MG/G
OINTMENT TOPICAL
COMMUNITY
Start: 2017-08-22 | End: 2022-02-28 | Stop reason: ALTCHOICE

## 2018-02-15 NOTE — PROGRESS NOTES
Subjective:       Patient ID: Elodia Serrano is a 62 y.o. female.    Chief Complaint: Hypertension; Hyperlipidemia; Hypothyroidism; and Asthma    Patient presents here for follow-up of hypertension, hyperlipidemia, hypothyroidism, and asthma.  She has not been seen by me in approximately 4 years but she has been seen by the PA/NP in the meantime.  She states she is feeling well and she has no new complaints today.  Her hypertension has been well controlled on her present medication and she is tolerating her medications well.  She states she is following her low-sodium diet as well as a low-fat low-cholesterol diet, and her weight is stable.  She does not do any routine exercise but she is very active in that she is having to raise four grandchildren.  As far as her hyperlipidemia, this has been well controlled on her present dose of pravastatin and she is due for lab work today.  Her hypothyroidism has also been stable on her present dose of levothyroxine 25 µg daily, and this will also be checked.  Her asthma is stable and she really has not had any problems with this in some time and she only uses her rescue inhaler on an as-needed basis.  She does not take anything on a daily basis for her asthma.  As far as her health maintenance, she is due for flu vaccine and a shingles vaccine and she agrees to a flu vaccine today.  It also shows that she is due for a mammogram and Pap smear but she does these annually with her gynecologist and I will get a copy of these results for our chart.      Hypertension   This is a chronic problem. The problem is unchanged. The problem is controlled. Pertinent negatives include no chest pain, headaches, palpitations or shortness of breath. Risk factors for coronary artery disease include dyslipidemia and post-menopausal state. Past treatments include ACE inhibitors. The current treatment provides moderate improvement. There are no compliance problems.  There is no history of kidney  disease, CAD/MI, CVA or heart failure.   Hyperlipidemia   This is a chronic problem. The problem is controlled. Recent lipid tests were reviewed and are normal. Pertinent negatives include no chest pain or shortness of breath. Current antihyperlipidemic treatment includes statins. The current treatment provides moderate improvement of lipids. There are no compliance problems.  Risk factors for coronary artery disease include dyslipidemia, hypertension and post-menopausal.     Review of Systems   Constitutional: Negative for chills, fatigue, fever and unexpected weight change.   HENT: Negative for congestion, ear pain, postnasal drip and sore throat.    Respiratory: Negative for cough, shortness of breath and wheezing.    Cardiovascular: Negative for chest pain and palpitations.   Gastrointestinal: Negative for abdominal pain, diarrhea, nausea and vomiting.   Genitourinary: Negative for dysuria, frequency and pelvic pain.   Musculoskeletal: Negative for arthralgias and back pain.   Neurological: Negative for dizziness, light-headedness and headaches.   Psychiatric/Behavioral: Negative for sleep disturbance. The patient is not nervous/anxious.        Objective:      Physical Exam   Constitutional: She is oriented to person, place, and time. She appears well-developed and well-nourished.   HENT:   Head: Normocephalic and atraumatic.   Right Ear: External ear normal.   Left Ear: External ear normal.   Nose: Nose normal.   Mouth/Throat: Oropharynx is clear and moist.   Neck: Normal range of motion. Neck supple. No thyromegaly present.   Cardiovascular: Normal rate, regular rhythm, normal heart sounds and intact distal pulses.    No murmur heard.  Pulmonary/Chest: Effort normal and breath sounds normal. She has no wheezes. She has no rales.   Musculoskeletal: Normal range of motion. She exhibits no edema or tenderness.   Lymphadenopathy:     She has no cervical adenopathy.   Neurological: She is alert and oriented to  person, place, and time. She has normal reflexes. No cranial nerve deficit.   Psychiatric: She has a normal mood and affect. Her behavior is normal.   Vitals reviewed.      Assessment:       1. Essential hypertension    2. Mixed hyperlipidemia    3. Acquired hypothyroidism    4. Asthma in remission        Plan:       1.  Flu vaccine today  2.  CMP, lipid profile, TSH  3.  Continue low-sodium, low-fat low-cholesterol diet and exercise  4.  Continue present medications as her hypertension, hyperlipidemia, hypothyroidism, and asthma are all stable  5.  Follow-up with me in 6 months or when necessary        Patient readiness: acceptance and barriers:none    During the course of the visit the patient was educated and counseled about the following:     Hypertension:   Dietary sodium restriction.  Regular aerobic exercise.  Follow up: 6 months and as needed.    Goals: Hypertension: Reduce Blood Pressure    Did patient meet goals/outcomes: Yes    The following self management tools provided: blood pressure log    Patient Instructions (the written plan) was given to the patient/family.     Time spent with patient: 30 minutes    Barriers to medications present (no )    Adverse reactions to current medications (no)    Over the counter medications reviewed (Yes)

## 2018-02-15 NOTE — PATIENT INSTRUCTIONS
Weight Management: Getting Started  Healthy bodies come in all shapes and sizes. Not all bodies are made to be thin. For some people, a healthy weight is higher than the average weight listed on weight charts. Your healthcare provider can help you decide on a healthy weight for you.    Reasons to lose weight  Losing weight can help with some health problems, such as high blood pressure, heart disease, diabetes, sleep apnea, and arthritis. You may also feel more energy.  Set your long-term goal  Your goal doesn't even have to be a specific weight. You may decide on a fitness goal (such as being able to walk 10 miles a week), or a health goal (such as lowering your blood pressure). Choose a goal that is measurable and reasonable, so you know when you've reached it. A goal of reaching a BMI of less than 25 is not always reasonable (or possible).   Make an action plan  Habits dont change overnight. Setting your goals too high can leave you feeling discouraged if you cant reach them. Be realistic. Choose one or two small changes you can make now. Set an action plan for how you are going to make these changes. When you can stick to this plan, keep making a few more small changes. Taking small steps will help you stay on the path to success.  Track your progress  Write down your goals. Then, keep a daily record of your progress. Write down what you eat and how active you are. This record lets you look back on how much youve done. It may also help when youre feeling frustrated. Reward yourself for success. Even if you dont reach every goal, give yourself credit for what you do get done.  Get support  Encouragement from others can help make losing weight easier. Ask your family members and friends for support. They may even want to join you. Also look to your healthcare provider, registered dietitian, and  for help. Your local hospital can give you more information about nutrition, exercise, and  weight loss.  Date Last Reviewed: 1/31/2016 © 2000-2017 [employer]. [address] [place] [address], [place] [address]. All rights reserved. This information is not intended as a substitute for professional medical care. Always follow your healthcare professional's instructions.        Walking for Fitness  Fitness walking has something for everyone, even people who are already fit. Walking is one of the safest ways to condition your body aerobically. It can boost energy, help you lose weight, and reduce stress.    Physical benefits  · Walking strengthens your heart and lungs, and tones your muscles.  · When walking, your feet land with less impact than in other sports. This reduces chances of muscle, bone, and joint injury.  · Regular walking improves your cholesterol levels and lowers your risk of heart disease. And it helps you control your blood sugar if you have diabetes.  · Walking is a weight-bearing activity, which helps maintain bone density. This can help prevent osteoporosis.  Personal rewards  · Taking walks can help you relax and manage stress. And fitness walking may make you feel better about yourself.  · Walking can help you sleep better at night and make you less likely to be depressed.  · Regular walking may help maintain your memory as you get older.  · Walking is a great way to spend extra time with friends and family members. Be sure to invite your dog along!  Q&A about fitness walking  Q: Will walking keep me fit?  A: Yes. Regular walking at the right pace gives you all the benefits of other aerobic activities, such as jogging and swimming.  Q: Will walking help me lose weight and keep it off?  A: Yes. Per mile, walking can burn as many calories as jogging. Your health care provider can help work walking into your weight-loss plan.  Q: Is walking safe for my health?  A: Yes. Walking is safe if you have high blood pressure, diabetes, heart disease, or other conditions. Talk to your  healthcare provider before you start.  Date Last Reviewed: 4/1/2017  © 3906-6316 [employer]. [address] [place] [address], [place] [address]. All rights reserved. This information is not intended as a substitute for professional medical care. Always follow your healthcare professional's instructions.

## 2018-06-10 ENCOUNTER — TELEPHONE (OUTPATIENT)
Dept: UROLOGY | Facility: CLINIC | Age: 63
End: 2018-06-10

## 2018-06-10 DIAGNOSIS — N20.1 RIGHT URETERAL CALCULUS: Primary | ICD-10-CM

## 2018-06-10 NOTE — TELEPHONE ENCOUNTER
Patient admitted overnight 6/9-10 at [facility] for 3-4mm distal right obstructing calculus at UVJ with Cr 0.75, wbc 9.5, UA with trc LE nitrite negative lg blood. Did not require pain medication. Had nausea controlled with one dose zofran, afebrile    Spoke with hospitalist Dr Patel about recommendation for trial of passage with daily flomax, hydration, and prns for pain/nausea, as well as straining all voids  She is being given empiric levaquin.  Advised Ucx be sent    Pt to be DCed with close outpatient follow up.  Will book in for f/u with me in 2 weeks, with KUB/RBUS pior to appointment if has not passed stone. If passes stone, should bring to appointment and can cancel imaging.    Please contact pt asap to arrange follow up

## 2018-06-11 NOTE — TELEPHONE ENCOUNTER
Attempted to contact the patient in regards to recommendations and scheduling her imaging and follow up appt. Patient did not answer, I did leave a detailed message for her to call the clinic back and did provide a call back number for the office.

## 2018-06-25 ENCOUNTER — HOSPITAL ENCOUNTER (OUTPATIENT)
Dept: RADIOLOGY | Facility: HOSPITAL | Age: 63
Discharge: HOME | End: 2018-06-25
Attending: UROLOGY
Payer: COMMERCIAL

## 2018-06-25 DIAGNOSIS — N20.1 RIGHT URETERAL CALCULUS: ICD-10-CM

## 2018-06-25 PROCEDURE — 76770 US EXAM ABDO BACK WALL COMP: CPT | Mod: TC

## 2018-06-25 PROCEDURE — 74018 RADEX ABDOMEN 1 VIEW: CPT | Mod: 26,,, | Performed by: RADIOLOGY

## 2018-06-25 PROCEDURE — 76770 US EXAM ABDO BACK WALL COMP: CPT | Mod: 26,,, | Performed by: RADIOLOGY

## 2018-06-25 PROCEDURE — 74018 RADEX ABDOMEN 1 VIEW: CPT | Mod: TC,FY

## 2018-06-29 ENCOUNTER — OFFICE VISIT (OUTPATIENT)
Dept: UROLOGY | Facility: CLINIC | Age: 63
End: 2018-06-29
Payer: COMMERCIAL

## 2018-06-29 ENCOUNTER — APPOINTMENT (OUTPATIENT)
Dept: LAB | Facility: HOSPITAL | Age: 63
End: 2018-06-29
Attending: UROLOGY
Payer: COMMERCIAL

## 2018-06-29 VITALS
RESPIRATION RATE: 18 BRPM | SYSTOLIC BLOOD PRESSURE: 121 MMHG | BODY MASS INDEX: 31.15 KG/M2 | DIASTOLIC BLOOD PRESSURE: 78 MMHG | WEIGHT: 165 LBS | HEART RATE: 78 BPM | HEIGHT: 61 IN

## 2018-06-29 DIAGNOSIS — N20.0 KIDNEY STONES: Primary | ICD-10-CM

## 2018-06-29 LAB
BILIRUB SERPL-MCNC: ABNORMAL MG/DL
BLOOD URINE, POC: ABNORMAL
COLOR, POC UA: CLEAR
GLUCOSE UR QL STRIP: ABNORMAL
KETONES UR QL STRIP: ABNORMAL
LEUKOCYTE ESTERASE URINE, POC: ABNORMAL
NITRITE, POC UA: ABNORMAL
PH, POC UA: 5
PROTEIN, POC: ABNORMAL
SPECIFIC GRAVITY, POC UA: 1.03
UROBILINOGEN, POC UA: ABNORMAL

## 2018-06-29 PROCEDURE — 82365 CALCULUS SPECTROSCOPY: CPT

## 2018-06-29 PROCEDURE — 99999 PR PBB SHADOW E&M-EST. PATIENT-LVL IV: CPT | Mod: PBBFAC,,, | Performed by: UROLOGY

## 2018-06-29 PROCEDURE — 99215 OFFICE O/P EST HI 40 MIN: CPT | Mod: 25,S$GLB,, | Performed by: UROLOGY

## 2018-06-29 PROCEDURE — 81002 URINALYSIS NONAUTO W/O SCOPE: CPT | Mod: S$GLB,,, | Performed by: UROLOGY

## 2018-06-29 NOTE — PATIENT INSTRUCTIONS
Reviewed recommendations for stone prevention such as adequate daily hydration to produce goal daily Urine OutPut > 2-2.5 Liters, low salt low sodium moderate protein diet, avoiding tea and other oxalate rich foods, and use of fresh lemon as citrate is a natural stone inhibitor.

## 2018-06-29 NOTE — PROGRESS NOTES
[facility] Urology Consult:  Consult from: Dr Patel  Consult for: ureteral calculus    Elodia Serrano is a 61 yo F who presents for follow up of recent symptomatic right ureteral calculus.    She was admitted overnight 6/9-6/10 at [facility] for 3-4mm distal right obstructing calculus at UVJ with Cr 0.75, wbc 9.5, UA with trc LE nitrite negative lg blood. Did not require pain medication. Had nausea controlled with one dose zofran, afebrile. Discharged with trial of passage with daily flomax, hydration, and prns for pain/nausea, as well as straining all voids. Was also given empiric levaquin.     KUB 6/25/18: A group of phleboliths in the left pelvis and a single calcification in the right pelvis are unchanged since the prior studies (which demonstrated multiple pelvic phleboliths)  Renal US 6/25/18:  Right kidney: The right kidney measures 11.0 x 4.3 x 5.6 cm. No cortical thinning. No loss of corticomedullary distinction. Resistive index measures 0.68.  No mass. There are 3 small echogenic foci measuring 5-7 mm consistent with small nonobstructing stones.  No hydronephrosis.  Left kidney: The left kidney measures 10.5 x 5.4 x 5.0 cm cm. No cortical thinning. No loss of corticomedullary distinction. Resistive index measures 0.59.  No mass. No renal stone. No hydronephrosis.  The bladder is partially distended at the time of scanning and has an unremarkable appearance.    Review of CT from ER notes 1 R and multiple clustered L phleboliths. Stone is within 2cm of R UVJ. Small nonobst R stones.    She had the above imaging on Monday, have been instructed that if she did not pass her stone to proceed with imaging prior to follow-up.  However she presents today noting that she passed her stone Saturday or Sunday, and did not realize she did not need the imaging if she passed her stone.  She brought her stone in today for chemical analysis.  She did have 1 stone previously, in 2015, for which she saw Dr. Cross.   A 24 hr urinalysis at  that time demonstrated that the urine volume was low at 1.15 liters, the pH was 5.3 and the uric acid was elevated at 2.66.  She was advised a low purine diet at that time    She admittedly does not drink enough water.  She largely drinks coffee and tea throughout the day.  Denies flank pain, hematuria, dysuria.      Past Medical History:   Diagnosis Date    Hypertension        History reviewed. No pertinent surgical history.    Family History   Problem Relation Age of Onset    Arthritis Mother     COPD Father        Social History     Social History    Marital status:      Spouse name: N/A    Number of children: N/A    Years of education: N/A     Occupational History     [id number]     Social History Main Topics    Smoking status: Former Smoker     Packs/day: 1.50     Years: 22.00     Types: Cigarettes    Smokeless tobacco: Never Used      Comment: quit 1996    Alcohol use Yes      Comment: very very rarely    Drug use: No    Sexual activity: Not on file     Other Topics Concern    Not on file     Social History Narrative    No narrative on file       Review of patient's allergies indicates:   Allergen Reactions    No known drug allergies        Medications Reviewed: see MAR    ROS:    Constitutional: denies fevers, chills, night sweats, fatigue, malaise  Respiratory: negative for cough, shortness of breath, wheezing, dyspnea.  Cardiovascular: + for high blood pressure, negative for chest pain, varicose veins, ankle swelling, palpitations, syncope.  GI: negative for abdominal pain, heartburn, indigestion, nausea, vomiting, constipation, diarrhea, blood in stool.   Urology: as noted above in HPI  Endocrinology: negative for cold intolerance, excessive thirst, not feeling tired/sluggish, no heat intolerance.   Hematology/Lymph: negative for easy bleeding, easy bruising, swollen glands.  Musculoskeletal: negative for back pain, joint pain, joint swelling, neck pain.  Allergy-Immunology: negative  "for seasonal allergies, negative for unusual infections.   Skin: negative for boils, breast lumps, hives, itching, rash.   Neurology: negative for, dizziness, headache, tingling/numbness, tremors.   Psych: satisfied with life; negative for, anxiety, depression, suicidal thoughts.     PHYSICAL EXAM:    Vitals:    06/29/18 1015   BP: 121/78   Pulse: 78   Resp: 18     Body mass index is 31.18 kg/m². Weight: 74.8 kg (165 lb) Height: 5' 1" (154.9 cm)       General: Alert, cooperative, no distress, appears stated age  Head: Normocephalic, without obvious abnormality, atraumatic  Neck: no masses, no thyromegaly, no lymphadenopathy  Eyes: PERRL, conjunctiva/corneas clear  Lungs: Respirations unlabored, normal effort, no accessory muscle use  CV: Warm and well perfused extremities  Abdomen: Soft, non-tender, no CVA tenderness, no hepatosplenomegaly, no hernia  Extremities: Extremities normal, atraumatic, no cyanosis or edema  Skin: Normal color, texture, and turgor, no rashes or lesions  Psych: Appropriate, well oriented, normal affect, normal mood  Neuro: Non-focal    LABS:    Recent Results (from the past 336 hour(s))   POCT URINE DIPSTICK WITHOUT MICROSCOPE    Collection Time: 06/29/18 10:21 AM   Result Value Ref Range    Color, UA clear     Spec Grav UA 1.030     pH, UA 5.0     WBC, UA 1+     Nitrite, UA neg     Protein trace     Glucose, UA neg     Ketones, UA neg     Urobilinogen, UA neg     Bilirubin neg     Blood, UA trace    Urinary Stone Analysis    Collection Time: 06/29/18 10:36 AM   Result Value Ref Range    Stone Source stone          Assessment/Diagnosis:    1. Kidney stones  POCT URINE DIPSTICK WITHOUT MICROSCOPE    Urinary Stone Analysis    Basic metabolic panel    PTH, intact    Uric acid       Plans:  She has passed her recently symptomatic right ureteral calculus, which she brought with her today, and we will send for analysis.  There are no previous stone analyses, though there is at least suspicion of " uric acid stones with prior 24 hr urinalysis demonstrating high uric acid in the urine.  As well, her KUB distinguish is her pelvic phleboliths very well but does not note any stones in the right collecting system.  We did review her CT scan which has 2-3 small punctate nonobstructing lower pole stones, which is relatively consistent with her ultrasound this week.  This is also relatively stable from her CT scan 2015 which has a cluster of about 4 small lower pole stones.     We reviewed general recommendations for stone prevention such as adequate daily hydration to produce goal daily UOP > 2.5 L, low salt low sodium moderate protein diet, avoiding tea and other oxalate rich foods, and use of fresh lemon as citrate is a natural stone inhibitor.  With previous high urine uric acid, I did also reiterate a low purine diet and she has been given a handout on this as well.    As it has been years since her previous 24 hr urinalysis, and now she is 2 symptomatic stone episodes, we did discuss the need for an updated metabolic workup.  Will reorder a 24 hr urinalysis.  We did discuss that at the time of her urine collection, approximately that week, she should also have correlating serum labs including BMP, PTH, serum uric acid.  I will review her 24 hr urinalysis and correlating serum labs and contact her with any updated recommendations targeted specifically for stone prevention for this patient if there are any beyond the general stone recommendations.  Otherwise, advised annual screening, and she can return to clinic in 1 year at which time we will likely repeat KUB and renal ultrasound    45 min spent in encounter, over half in counseling

## 2018-07-03 LAB
ANNOTATION COMMENT IMP: NORMAL
COMPN STONE: NORMAL
SPECIMEN SOURCE: NORMAL
STONE ANALYSIS IR-IMP: NORMAL

## 2018-08-03 DIAGNOSIS — Z12.39 BREAST CANCER SCREENING: ICD-10-CM

## 2018-08-21 LAB
ALBUMIN SERPL-MCNC: 4.2 G/DL (ref 3.6–5.1)
ALBUMIN/GLOB SERPL: 1.7 (CALC) (ref 1–2.5)
ALP SERPL-CCNC: 54 U/L (ref 33–130)
ALT SERPL-CCNC: 21 U/L (ref 6–29)
AST SERPL-CCNC: 17 U/L (ref 10–35)
BILIRUB SERPL-MCNC: 0.5 MG/DL (ref 0.2–1.2)
BUN SERPL-MCNC: 10 MG/DL (ref 7–25)
BUN/CREAT SERPL: NORMAL (CALC) (ref 6–22)
CALCIUM SERPL-MCNC: 9.1 MG/DL (ref 8.6–10.4)
CHLORIDE SERPL-SCNC: 105 MMOL/L (ref 98–110)
CHOLEST SERPL-MCNC: 188 MG/DL
CHOLEST/HDLC SERPL: 3.8 (CALC)
CO2 SERPL-SCNC: 26 MMOL/L (ref 20–32)
CREAT SERPL-MCNC: 0.61 MG/DL (ref 0.5–0.99)
GFR SERPL CREATININE-BSD FRML MDRD: 99 ML/MIN/1.73M2
GLOBULIN SER CALC-MCNC: 2.5 G/DL (CALC) (ref 1.9–3.7)
GLUCOSE SERPL-MCNC: 89 MG/DL (ref 65–99)
HDLC SERPL-MCNC: 49 MG/DL
LDLC SERPL CALC-MCNC: 103 MG/DL (CALC)
NONHDLC SERPL-MCNC: 139 MG/DL (CALC)
POTASSIUM SERPL-SCNC: 4.4 MMOL/L (ref 3.5–5.3)
PROT SERPL-MCNC: 6.7 G/DL (ref 6.1–8.1)
SODIUM SERPL-SCNC: 140 MMOL/L (ref 135–146)
TRIGL SERPL-MCNC: 237 MG/DL
TSH SERPL-ACNC: 1.86 MIU/L (ref 0.4–4.5)

## 2018-09-04 ENCOUNTER — PATIENT OUTREACH (OUTPATIENT)
Dept: ADMINISTRATIVE | Facility: HOSPITAL | Age: 63
End: 2018-09-04

## 2018-09-06 ENCOUNTER — DOCUMENTATION ONLY (OUTPATIENT)
Dept: FAMILY MEDICINE | Facility: CLINIC | Age: 63
End: 2018-09-06

## 2018-09-06 ENCOUNTER — OFFICE VISIT (OUTPATIENT)
Dept: FAMILY MEDICINE | Facility: CLINIC | Age: 63
End: 2018-09-06
Payer: COMMERCIAL

## 2018-09-06 VITALS
HEART RATE: 64 BPM | BODY MASS INDEX: 32.92 KG/M2 | SYSTOLIC BLOOD PRESSURE: 123 MMHG | HEIGHT: 61 IN | DIASTOLIC BLOOD PRESSURE: 78 MMHG | WEIGHT: 174.38 LBS | TEMPERATURE: 98 F

## 2018-09-06 DIAGNOSIS — J45.998 ASTHMA IN REMISSION: ICD-10-CM

## 2018-09-06 DIAGNOSIS — I10 ESSENTIAL HYPERTENSION: Primary | ICD-10-CM

## 2018-09-06 DIAGNOSIS — E78.2 MIXED HYPERLIPIDEMIA: ICD-10-CM

## 2018-09-06 DIAGNOSIS — E03.9 ACQUIRED HYPOTHYROIDISM: ICD-10-CM

## 2018-09-06 PROCEDURE — 99213 OFFICE O/P EST LOW 20 MIN: CPT | Mod: S$GLB,,, | Performed by: FAMILY MEDICINE

## 2018-09-06 PROCEDURE — 99999 PR PBB SHADOW E&M-EST. PATIENT-LVL III: CPT | Mod: PBBFAC,,, | Performed by: FAMILY MEDICINE

## 2018-09-06 NOTE — PROGRESS NOTES
Pre-Visit Chart Review  For Appointment Scheduled on (date) 9/6/18    Health Maintenance Due   Topic Date Due    Pap Smear with HPV Cotest  06/03/2016    Mammogram  08/24/2017    Zoster Vaccine  10/18/2017    Influenza Vaccine  08/01/2018

## 2018-09-07 NOTE — PROGRESS NOTES
Subjective:       Patient ID: Elodia Serrano is a 62 y.o. female.    Chief Complaint: Hypertension; Hyperlipidemia; Hypothyroidism; and Asthma     Patient presents here for 6 month follow-up of hypertension, hyperlipidemia, hypothyroidism, and asthma.  Her hypertension is well controlled on her present medication and she is tolerating her medication well.  She has been off of her diet recently and her weight is up 10 lb over the last 6 months.  She states that she is back on her diet and has actually lost several lb over the last several weeks.  I have encouraged her to continue her diet and increase exercise to lose weight.  Her hyperlipidemia is well controlled at this time on her present dose of pravastatin 40 mg daily.  She recently had blood work done and this was reviewed with her.  Her CMP was normal; TSH is therapeutic at 1.86; total cholesterol 188, HDL 49, , triglycerides 237, and non . I have encouraged exercise and compliance with diet as noted above and this should help bring her cholesterol down further.  As noted above with her TSH, her hypothyroidism is stable on her present dose of Synthroid 25 mcg daily.  Her asthma is stable with only rare flares and none recently.  Her only other medical history is that she is under treatment at the present time for tendinitis of the left wrist which is improving.  As for screening, it shows that she is due for Pap smear and mammogram but she has had these done by her gynecologist and she does have these done every year.  I will attempt to get records of this.  She will get her flu vaccine next month.      Hypertension   This is a chronic problem. The problem is unchanged. The problem is controlled. Pertinent negatives include no chest pain, headaches, palpitations or shortness of breath. Risk factors for coronary artery disease include dyslipidemia, obesity and post-menopausal state. Past treatments include ACE inhibitors. The current  treatment provides moderate improvement. Compliance problems include exercise.  There is no history of kidney disease, CAD/MI, CVA or heart failure.   Hyperlipidemia   This is a chronic problem. The problem is controlled. Recent lipid tests were reviewed and are normal. Pertinent negatives include no chest pain, myalgias or shortness of breath. Current antihyperlipidemic treatment includes statins. The current treatment provides moderate improvement of lipids. Compliance problems include adherence to exercise.  Risk factors for coronary artery disease include dyslipidemia, hypertension, obesity and post-menopausal.     Review of Systems   Constitutional: Negative for chills, fatigue, fever and unexpected weight change.   HENT: Negative for congestion, ear pain, postnasal drip and sore throat.    Respiratory: Negative for cough and shortness of breath.    Cardiovascular: Negative for chest pain and palpitations.   Gastrointestinal: Negative for abdominal pain, diarrhea, nausea and vomiting.   Genitourinary: Negative for difficulty urinating, dysuria, flank pain and pelvic pain.   Musculoskeletal: Negative for arthralgias, back pain and myalgias.        Tendinitis of left wrist which is improving with treatment   Neurological: Negative for dizziness, light-headedness and headaches.   Psychiatric/Behavioral: Negative for sleep disturbance. The patient is not nervous/anxious.        Objective:      Physical Exam   Constitutional: She is oriented to person, place, and time.   HENT:   Head: Normocephalic and atraumatic.   Right Ear: External ear normal.   Left Ear: External ear normal.   Nose: Nose normal.   Mouth/Throat: Oropharynx is clear and moist.   Neck: Normal range of motion. Neck supple. No thyromegaly present.   Cardiovascular: Normal rate, regular rhythm, normal heart sounds and intact distal pulses.   No murmur heard.  Pulmonary/Chest: Effort normal and breath sounds normal. She has no wheezes. She has no  rales.   Musculoskeletal: Normal range of motion. She exhibits no edema or tenderness.   Lymphadenopathy:     She has no cervical adenopathy.   Neurological: She is alert and oriented to person, place, and time. She has normal reflexes. No cranial nerve deficit.   Vitals reviewed.      Assessment:       1. Essential hypertension    2. Mixed hyperlipidemia    3. Acquired hypothyroidism    4. Asthma in remission        Plan:       1.   Continue present medication as her hypertension, hyperlipidemia, hypothyroidism, and asthma are all stable and controlled  2. I have emphasized the need to follow her low-sodium, low-fat low-cholesterol diet well as well as increased exercise for weight loss  3. Follow up with me in 6 months or p.r.n.        Patient readiness: acceptance and barriers:none    During the course of the visit the patient was educated and counseled about the following:     Hypertension:   Dietary sodium restriction.  Regular aerobic exercise.  Follow up: 6 months and as needed.    Goals: Hypertension: Reduce Blood Pressure    Did patient meet goals/outcomes: Yes    The following self management tools provided: blood pressure log    Patient Instructions (the written plan) was given to the patient/family.     Time spent with patient: 30 minutes    Barriers to medications present (no )    Adverse reactions to current medications (no)    Over the counter medications reviewed (Yes)

## 2019-02-04 DIAGNOSIS — I10 ESSENTIAL HYPERTENSION: ICD-10-CM

## 2019-02-04 RX ORDER — MOEXIPRIL HYDROCHLORIDE 7.5 MG/1
7.5 TABLET, FILM COATED ORAL DAILY
Qty: 90 TABLET | Refills: 3 | Status: SHIPPED | OUTPATIENT
Start: 2019-02-04 | End: 2019-03-14 | Stop reason: ALTCHOICE

## 2019-02-27 ENCOUNTER — TELEPHONE (OUTPATIENT)
Dept: ADMINISTRATIVE | Facility: HOSPITAL | Age: 64
End: 2019-02-27

## 2019-02-27 ENCOUNTER — PATIENT OUTREACH (OUTPATIENT)
Dept: ADMINISTRATIVE | Facility: HOSPITAL | Age: 64
End: 2019-02-27

## 2019-03-01 ENCOUNTER — TELEPHONE (OUTPATIENT)
Dept: ADMINISTRATIVE | Facility: HOSPITAL | Age: 64
End: 2019-03-01

## 2019-03-13 ENCOUNTER — DOCUMENTATION ONLY (OUTPATIENT)
Dept: FAMILY MEDICINE | Facility: CLINIC | Age: 64
End: 2019-03-13

## 2019-03-13 DIAGNOSIS — E78.2 MIXED HYPERLIPIDEMIA: ICD-10-CM

## 2019-03-13 RX ORDER — PRAVASTATIN SODIUM 40 MG/1
40 TABLET ORAL DAILY
Qty: 90 TABLET | Refills: 3 | Status: SHIPPED | OUTPATIENT
Start: 2019-03-13 | End: 2020-03-02

## 2019-03-13 NOTE — PROGRESS NOTES
Pre-Visit Chart Review  For Appointment Scheduled on 3/14/2019.      Health Maintenance Due   Topic Date Due    Mammogram  08/24/2017    Zoster Vaccine  10/18/2017    Influenza Vaccine  08/01/2018

## 2019-03-14 ENCOUNTER — OFFICE VISIT (OUTPATIENT)
Dept: FAMILY MEDICINE | Facility: CLINIC | Age: 64
End: 2019-03-14
Payer: COMMERCIAL

## 2019-03-14 VITALS
WEIGHT: 176.81 LBS | HEART RATE: 71 BPM | SYSTOLIC BLOOD PRESSURE: 116 MMHG | HEIGHT: 61 IN | TEMPERATURE: 98 F | DIASTOLIC BLOOD PRESSURE: 77 MMHG | BODY MASS INDEX: 33.38 KG/M2

## 2019-03-14 DIAGNOSIS — G47.30 SLEEP APNEA, UNSPECIFIED TYPE: ICD-10-CM

## 2019-03-14 DIAGNOSIS — E03.9 ACQUIRED HYPOTHYROIDISM: ICD-10-CM

## 2019-03-14 DIAGNOSIS — I10 ESSENTIAL HYPERTENSION: Primary | ICD-10-CM

## 2019-03-14 DIAGNOSIS — E78.2 MIXED HYPERLIPIDEMIA: ICD-10-CM

## 2019-03-14 PROCEDURE — 99213 OFFICE O/P EST LOW 20 MIN: CPT | Mod: S$GLB,,, | Performed by: FAMILY MEDICINE

## 2019-03-14 PROCEDURE — 99999 PR PBB SHADOW E&M-EST. PATIENT-LVL III: ICD-10-PCS | Mod: PBBFAC,,, | Performed by: FAMILY MEDICINE

## 2019-03-14 PROCEDURE — 99999 PR PBB SHADOW E&M-EST. PATIENT-LVL III: CPT | Mod: PBBFAC,,, | Performed by: FAMILY MEDICINE

## 2019-03-14 PROCEDURE — 99213 PR OFFICE/OUTPT VISIT, EST, LEVL III, 20-29 MIN: ICD-10-PCS | Mod: S$GLB,,, | Performed by: FAMILY MEDICINE

## 2019-03-14 RX ORDER — OLMESARTAN MEDOXOMIL 20 MG/1
20 TABLET ORAL DAILY
Qty: 90 TABLET | Refills: 3 | Status: SHIPPED | OUTPATIENT
Start: 2019-03-14 | End: 2020-03-02

## 2019-03-23 NOTE — PROGRESS NOTES
Subjective:       Patient ID: Elodia Serrano is a 63 y.o. female.    Chief Complaint: Hypertension; Hyperlipidemia; and Hypothyroidism    Patient presents here for 6 month follow-up of hypertension, hyperlipidemia, and hypothyroidism.  Her hypertension is well controlled with her present dose of ACE-inhibitor.  I did discuss with her the recent study suggesting increased risk of lung cancer with long-term use of Ace inhibitors and suggested we switch to a different medication.  She is in agreement and her Ace inhibitor will be discontinued and she will be treated with an ARB.  She states she is following her low-sodium, low-fat low-cholesterol diet well and is exercising on a regular basis.  Her weight is stable.  Her hyperlipidemia is controlled on her present dose of pravastatin, and she is tolerating her pravastatin well.  Her hypothyroidism is stable on her present dose of levothyroxine.  She has no other new complaints at this time.  She is up-to-date with all of her routine screening and immunizations with the exception of shingles vaccine and a flu vaccine this year.  Since it is very late in the flu season she likes to declined the flu shot this year and will pick it up next year.      Hypertension   This is a chronic problem. The problem is unchanged. The problem is controlled. Pertinent negatives include no chest pain, headaches, palpitations or shortness of breath. Risk factors for coronary artery disease include dyslipidemia. Past treatments include ACE inhibitors. The current treatment provides moderate improvement. There are no compliance problems.  There is no history of kidney disease, CAD/MI, CVA or heart failure.   Hyperlipidemia   This is a chronic problem. The problem is controlled. Recent lipid tests were reviewed and are normal. Pertinent negatives include no chest pain or shortness of breath. Current antihyperlipidemic treatment includes statins. The current treatment provides moderate  improvement of lipids. There are no compliance problems.  Risk factors for coronary artery disease include dyslipidemia and hypertension.     Review of Systems   Constitutional: Negative for chills, fatigue, fever and unexpected weight change.   HENT: Negative for congestion, ear pain, postnasal drip and sore throat.    Respiratory: Negative for cough and shortness of breath.    Cardiovascular: Negative for chest pain and palpitations.   Gastrointestinal: Negative for abdominal pain, diarrhea, nausea and vomiting.   Genitourinary: Negative for difficulty urinating, dysuria, flank pain and pelvic pain.   Musculoskeletal: Negative for arthralgias and back pain.   Neurological: Negative for dizziness, light-headedness and headaches.   Psychiatric/Behavioral: Negative for sleep disturbance. The patient is not nervous/anxious.        Objective:      Physical Exam   Constitutional: She is oriented to person, place, and time. She appears well-developed and well-nourished.   HENT:   Head: Normocephalic and atraumatic.   Right Ear: External ear normal.   Left Ear: External ear normal.   Nose: Nose normal.   Mouth/Throat: Oropharynx is clear and moist.   Neck: Normal range of motion. Neck supple. No thyromegaly present.   Cardiovascular: Normal rate, regular rhythm, normal heart sounds and intact distal pulses.   No murmur heard.  Pulmonary/Chest: Effort normal and breath sounds normal. She has no wheezes. She has no rales.   Abdominal: Soft. Bowel sounds are normal. She exhibits no mass. There is no tenderness. There is no rebound.   Musculoskeletal: Normal range of motion. She exhibits no edema or tenderness.   Lymphadenopathy:     She has no cervical adenopathy.   Neurological: She is alert and oriented to person, place, and time. She has normal reflexes. No cranial nerve deficit.   Skin: No rash noted.   Psychiatric: She has a normal mood and affect. Her behavior is normal.   Vitals reviewed.      Assessment:       1.  Essential hypertension    2. Acquired hypothyroidism    3. Mixed hyperlipidemia    4. Sleep apnea, unspecified type        Plan:       1.  Continue present medication as her hypertension and hyperlipidemia and hypothyroidism are all well controlled  2.  Discontinue Univasc and replace it with olmesartan 20 mg daily  3.  Monitor blood pressures at home, keep a log of these, and return to me to document good control since we changed medications  4.  Follow up with me in 6 months or p.r.n.        Patient readiness: acceptance and barriers:none    During the course of the visit the patient was educated and counseled about the following:     Hypertension:   Dietary sodium restriction.  Regular aerobic exercise.  Follow up: 6 months and as needed.    Goals: Hypertension: Reduce Blood Pressure    Did patient meet goals/outcomes: Yes    The following self management tools provided: blood pressure log    Patient Instructions (the written plan) was given to the patient/family.     Time spent with patient: 30 minutes    Barriers to medications present (no )    Adverse reactions to current medications (no)    Over the counter medications reviewed (Yes)

## 2019-04-15 DIAGNOSIS — E03.9 ACQUIRED HYPOTHYROIDISM: ICD-10-CM

## 2019-04-15 RX ORDER — LEVOTHYROXINE SODIUM 25 UG/1
TABLET ORAL
Qty: 90 TABLET | Refills: 3 | Status: SHIPPED | OUTPATIENT
Start: 2019-04-15 | End: 2020-04-06 | Stop reason: SDUPTHER

## 2019-09-05 DIAGNOSIS — R53.83 FATIGUE DUE TO SLEEP PATTERN DISTURBANCE: ICD-10-CM

## 2019-09-05 DIAGNOSIS — G47.9 FATIGUE DUE TO SLEEP PATTERN DISTURBANCE: ICD-10-CM

## 2019-09-05 DIAGNOSIS — G47.19 EXCESSIVE DAYTIME SLEEPINESS: ICD-10-CM

## 2019-09-05 DIAGNOSIS — G47.33 OSA (OBSTRUCTIVE SLEEP APNEA): Primary | ICD-10-CM

## 2019-09-05 DIAGNOSIS — R06.83 SNORING: ICD-10-CM

## 2019-09-19 ENCOUNTER — PROCEDURE VISIT (OUTPATIENT)
Dept: SLEEP MEDICINE | Facility: HOSPITAL | Age: 64
End: 2019-09-19
Attending: INTERNAL MEDICINE
Payer: COMMERCIAL

## 2019-09-19 DIAGNOSIS — G47.33 OSA (OBSTRUCTIVE SLEEP APNEA): ICD-10-CM

## 2019-09-19 DIAGNOSIS — R53.83 FATIGUE DUE TO SLEEP PATTERN DISTURBANCE: ICD-10-CM

## 2019-09-19 DIAGNOSIS — G47.9 FATIGUE DUE TO SLEEP PATTERN DISTURBANCE: ICD-10-CM

## 2019-09-19 DIAGNOSIS — R06.83 SNORING: ICD-10-CM

## 2019-09-19 DIAGNOSIS — G47.19 EXCESSIVE DAYTIME SLEEPINESS: ICD-10-CM

## 2019-09-19 PROCEDURE — 95811 POLYSOM 6/>YRS CPAP 4/> PARM: CPT

## 2019-10-03 ENCOUNTER — OFFICE VISIT (OUTPATIENT)
Dept: FAMILY MEDICINE | Facility: CLINIC | Age: 64
End: 2019-10-03
Payer: COMMERCIAL

## 2019-10-03 VITALS
HEART RATE: 85 BPM | TEMPERATURE: 98 F | DIASTOLIC BLOOD PRESSURE: 72 MMHG | OXYGEN SATURATION: 98 % | SYSTOLIC BLOOD PRESSURE: 116 MMHG | WEIGHT: 172.63 LBS | BODY MASS INDEX: 32.59 KG/M2 | HEIGHT: 61 IN

## 2019-10-03 DIAGNOSIS — E03.9 ACQUIRED HYPOTHYROIDISM: ICD-10-CM

## 2019-10-03 DIAGNOSIS — E78.2 MIXED HYPERLIPIDEMIA: ICD-10-CM

## 2019-10-03 DIAGNOSIS — I10 ESSENTIAL HYPERTENSION: Primary | ICD-10-CM

## 2019-10-03 DIAGNOSIS — J45.998 ASTHMA IN REMISSION: ICD-10-CM

## 2019-10-03 PROCEDURE — 99999 PR PBB SHADOW E&M-EST. PATIENT-LVL III: CPT | Mod: PBBFAC,,, | Performed by: FAMILY MEDICINE

## 2019-10-03 PROCEDURE — 99213 PR OFFICE/OUTPT VISIT, EST, LEVL III, 20-29 MIN: ICD-10-PCS | Mod: S$GLB,,, | Performed by: FAMILY MEDICINE

## 2019-10-03 PROCEDURE — 99213 OFFICE O/P EST LOW 20 MIN: CPT | Mod: S$GLB,,, | Performed by: FAMILY MEDICINE

## 2019-10-03 PROCEDURE — 99999 PR PBB SHADOW E&M-EST. PATIENT-LVL III: ICD-10-PCS | Mod: PBBFAC,,, | Performed by: FAMILY MEDICINE

## 2019-10-03 RX ORDER — ALBUTEROL SULFATE 90 UG/1
2 AEROSOL, METERED RESPIRATORY (INHALATION) EVERY 6 HOURS PRN
Qty: 1 INHALER | Refills: 11 | Status: SHIPPED | OUTPATIENT
Start: 2019-10-03 | End: 2020-10-25

## 2019-10-06 NOTE — PROGRESS NOTES
Subjective:       Patient ID: Elodia Serrano is a 63 y.o. female.    Chief Complaint: Hypertension; Hyperlipidemia; and Hypothyroidism    Patient presents here for 6 month follow-up of hypertension, hyperlipidemia, and hypothyroidism.  Her hypertension is well controlled with her present dose of Benicar as well as her low-sodium diet.  Her weight has decreased 4 lb since her last visit 6 months ago.  Her hyperlipidemia is also well controlled on her present dose of pravastatin as well as her low-fat low-cholesterol diet.  She has been compliant with diet and with exercise.  Her hypothyroidism is stable on her present dose of levothyroxine 25 mcg daily.  Her last labs were a year ago and she is due to have her lipid profile and TSH checked.  She also does have a history of asthma which rarely gives her problem and she has not had any recent issues with the asthma.  As far as health maintenance, she is up-to-date with all of her recommended screening exams with the exception of a shingles vaccine and her lipid profile.    Hypertension   This is a chronic problem. The problem is unchanged. The problem is controlled. Pertinent negatives include no chest pain, headaches, palpitations or shortness of breath. Risk factors for coronary artery disease include dyslipidemia, obesity and post-menopausal state. Past treatments include angiotensin blockers. The current treatment provides significant improvement. There are no compliance problems.  There is no history of kidney disease, CAD/MI, CVA or heart failure.   Hyperlipidemia   This is a chronic problem. The problem is controlled. Recent lipid tests were reviewed and are normal. Exacerbating diseases include hypothyroidism and obesity. Pertinent negatives include no chest pain or shortness of breath. Current antihyperlipidemic treatment includes statins. The current treatment provides significant improvement of lipids. There are no compliance problems.  Risk factors for  coronary artery disease include dyslipidemia, hypertension, obesity and post-menopausal.     Review of Systems   Constitutional: Negative for chills, fatigue, fever and unexpected weight change.        Weight is decreased 4 lb in 6 months   HENT: Negative for congestion, ear pain, postnasal drip and sore throat.    Respiratory: Negative for cough and shortness of breath.    Cardiovascular: Negative for chest pain and palpitations.   Gastrointestinal: Negative for abdominal pain, diarrhea, nausea and vomiting.   Genitourinary: Negative for difficulty urinating, dysuria, flank pain and pelvic pain.   Musculoskeletal: Negative for arthralgias and back pain.   Neurological: Negative for dizziness, light-headedness and headaches.   Psychiatric/Behavioral: Negative for sleep disturbance. The patient is not nervous/anxious.        Objective:      Physical Exam   Constitutional: She is oriented to person, place, and time.   Obese middle-aged female in no acute distress at this time   HENT:   Head: Normocephalic and atraumatic.   Right Ear: External ear normal.   Left Ear: External ear normal.   Nose: Nose normal.   Mouth/Throat: Oropharynx is clear and moist.   Neck: Normal range of motion. Neck supple. No thyromegaly present.   Cardiovascular: Normal rate, regular rhythm, normal heart sounds and intact distal pulses.   No murmur heard.  Pulmonary/Chest: Effort normal and breath sounds normal. She has no wheezes. She has no rales.   Musculoskeletal: Normal range of motion. She exhibits no edema or tenderness.   Lymphadenopathy:     She has no cervical adenopathy.   Neurological: She is alert and oriented to person, place, and time. She has normal reflexes. No cranial nerve deficit.   Psychiatric: She has a normal mood and affect. Her behavior is normal.   Vitals reviewed.      Assessment:       1. Essential hypertension    2. Mixed hyperlipidemia    3. Acquired hypothyroidism    4. Asthma in remission        Plan:       1.   Continue present medication as her hypertension, hyperlipidemia, and hypothyroidism are all well controlled  2.  Continue low-sodium, low-fat low-cholesterol diet but increase exercise for weight loss  3.  CMP, lipid profile, TSH  4.  Follow up with me in 6 months or p.r.n.        Patient readiness: acceptance and barriers:none    During the course of the visit the patient was educated and counseled about the following:     Hypertension:   Dietary sodium restriction.  Regular aerobic exercise.  Follow up: 6 months and as needed.    Goals: Hypertension: Reduce Blood Pressure    Did patient meet goals/outcomes: Yes    The following self management tools provided: blood pressure log    Patient Instructions (the written plan) was given to the patient/family.     Time spent with patient: 30 minutes    Barriers to medications present (no )    Adverse reactions to current medications (no)    Over the counter medications reviewed (Yes)

## 2019-12-30 NOTE — LETTER
February 27, 2019    Elodia Serrano  [address]  [place] [address]             [facility]  [address]  [facility] [address]  Phone: [phone] Dear Elodia Serrano,      [facility] is committed to your overall health.  To help you get the most out of each of your visits, we will review your information to make sure you are up to date on all of your recommended tests and/or procedures.      Dr. Jerardo Hedrick Jr, MD  has found that your chart shows you may be due for your MAMMOGRAM and/or a PAP SMEAR  If you have had any of the above done at another facility, please bring the records or information with you so that your record at [facility] will be complete.  If you would like to schedule any of these, please contact me.    If you are currently taking medication, please bring it with you to your appointment for review.    Thank you for letting [facility] take care of you r healthcare needs.    If you see a provider from Dr Zamarripa office the results will need to be picked up and brought to us or, you as the patient will have to request the record for your PCP. We have a difficult time getting records from that office.    Your [facility] Team,  MD Dorina Mccarthy Jr, LPN Clinical Care Coordinator  [facility] [facility] [address] [place] [address]  Phone [phone]  Fax [phone]          Patient is calling stating she was seen in Urgent care on sat 12/28 and needs a Dr. Hood dated from 12/27 thru 12/29. Patient is requesting a call to let her know when it is ready to be picked up.    Patient Name: Sonja Lowe  Caller Name: self  Callback Number: [phone]  Best Availability: anytime  Did you confirm the message with the caller?: yes    Thank you,  Shyanne Culp

## 2020-01-25 LAB
ALBUMIN SERPL-MCNC: 4.2 G/DL (ref 3.6–5.1)
ALBUMIN/GLOB SERPL: 1.9 (CALC) (ref 1–2.5)
ALP SERPL-CCNC: 45 U/L (ref 33–130)
ALT SERPL-CCNC: 22 U/L (ref 6–29)
AST SERPL-CCNC: 15 U/L (ref 10–35)
BILIRUB SERPL-MCNC: 0.4 MG/DL (ref 0.2–1.2)
BUN SERPL-MCNC: 15 MG/DL (ref 7–25)
BUN/CREAT SERPL: ABNORMAL (CALC) (ref 6–22)
CALCIUM SERPL-MCNC: 9.1 MG/DL (ref 8.6–10.4)
CHLORIDE SERPL-SCNC: 106 MMOL/L (ref 98–110)
CHOLEST SERPL-MCNC: 162 MG/DL
CHOLEST/HDLC SERPL: 3.2 (CALC)
CO2 SERPL-SCNC: 27 MMOL/L (ref 20–32)
CREAT SERPL-MCNC: 0.66 MG/DL (ref 0.5–0.99)
GFRSERPLBLD MDRD-ARVRAT: 95 ML/MIN/1.73M2
GLOBULIN SER CALC-MCNC: 2.2 G/DL (CALC) (ref 1.9–3.7)
GLUCOSE SERPL-MCNC: 105 MG/DL (ref 65–99)
HDLC SERPL-MCNC: 50 MG/DL
LDLC SERPL CALC-MCNC: 82 MG/DL (CALC)
NONHDLC SERPL-MCNC: 112 MG/DL (CALC)
POTASSIUM SERPL-SCNC: 4.4 MMOL/L (ref 3.5–5.3)
PROT SERPL-MCNC: 6.4 G/DL (ref 6.1–8.1)
SODIUM SERPL-SCNC: 140 MMOL/L (ref 135–146)
TRIGL SERPL-MCNC: 205 MG/DL
TSH SERPL-ACNC: 1.42 MIU/L (ref 0.4–4.5)

## 2020-01-27 ENCOUNTER — TELEPHONE (OUTPATIENT)
Dept: FAMILY MEDICINE | Facility: CLINIC | Age: 65
End: 2020-01-27

## 2020-01-27 NOTE — TELEPHONE ENCOUNTER
----- Message from Sandra Kuhn sent at 1/27/2020 10:19 AM CST -----  Contact: Elodia pt  Type:  Test Results    Who Called:  Elodia  Name of Test (Lab/Mammo/Etc):  labs  Date of Test:  n/a  Ordering Provider:  Floridalma  Where the test was performed:  n/a  Best Call Back Number:  [phone]  Additional Information:  Pls call pt regarding her results

## 2020-03-01 DIAGNOSIS — E78.2 MIXED HYPERLIPIDEMIA: ICD-10-CM

## 2020-03-01 DIAGNOSIS — I10 ESSENTIAL HYPERTENSION: ICD-10-CM

## 2020-03-02 RX ORDER — PRAVASTATIN SODIUM 40 MG/1
40 TABLET ORAL DAILY
Qty: 90 TABLET | Refills: 3 | Status: SHIPPED | OUTPATIENT
Start: 2020-03-02 | End: 2021-03-07

## 2020-03-02 RX ORDER — OLMESARTAN MEDOXOMIL 20 MG/1
TABLET ORAL
Qty: 90 TABLET | Refills: 3 | Status: SHIPPED | OUTPATIENT
Start: 2020-03-02 | End: 2021-03-07

## 2020-04-06 DIAGNOSIS — E03.9 ACQUIRED HYPOTHYROIDISM: ICD-10-CM

## 2020-04-06 RX ORDER — LEVOTHYROXINE SODIUM 25 UG/1
25 TABLET ORAL
Qty: 90 TABLET | Refills: 3 | Status: SHIPPED | OUTPATIENT
Start: 2020-04-06 | End: 2021-04-26 | Stop reason: SDUPTHER

## 2020-05-12 NOTE — TELEPHONE ENCOUNTER
Labs show that cholesterol is not at goal  We should increase her Pravachol and repeat in 3 months    Her glucose is elevated and we should check a1c level    Orders are in but  it looks like she uses outside lab. [facility] or [facility]?       stable

## 2020-08-17 ENCOUNTER — OFFICE VISIT (OUTPATIENT)
Dept: FAMILY MEDICINE | Facility: CLINIC | Age: 65
End: 2020-08-17
Payer: COMMERCIAL

## 2020-08-17 VITALS
DIASTOLIC BLOOD PRESSURE: 68 MMHG | BODY MASS INDEX: 32.26 KG/M2 | HEART RATE: 68 BPM | SYSTOLIC BLOOD PRESSURE: 122 MMHG | HEIGHT: 61 IN | WEIGHT: 170.88 LBS | TEMPERATURE: 98 F | OXYGEN SATURATION: 97 %

## 2020-08-17 DIAGNOSIS — E78.2 MIXED HYPERLIPIDEMIA: ICD-10-CM

## 2020-08-17 DIAGNOSIS — J45.998 ASTHMA IN REMISSION: ICD-10-CM

## 2020-08-17 DIAGNOSIS — I10 ESSENTIAL HYPERTENSION: Primary | ICD-10-CM

## 2020-08-17 DIAGNOSIS — E03.9 ACQUIRED HYPOTHYROIDISM: ICD-10-CM

## 2020-08-17 PROCEDURE — 90471 IMMUNIZATION ADMIN: CPT | Mod: S$GLB,,, | Performed by: FAMILY MEDICINE

## 2020-08-17 PROCEDURE — 99213 OFFICE O/P EST LOW 20 MIN: CPT | Mod: 25,S$GLB,, | Performed by: FAMILY MEDICINE

## 2020-08-17 PROCEDURE — 90732 PNEUMOCOCCAL POLYSACCHARIDE VACCINE 23-VALENT =>2YO SQ IM: ICD-10-PCS | Mod: S$GLB,,, | Performed by: FAMILY MEDICINE

## 2020-08-17 PROCEDURE — 99999 PR PBB SHADOW E&M-EST. PATIENT-LVL IV: CPT | Mod: PBBFAC,,, | Performed by: FAMILY MEDICINE

## 2020-08-17 PROCEDURE — 99999 PR PBB SHADOW E&M-EST. PATIENT-LVL IV: ICD-10-PCS | Mod: PBBFAC,,, | Performed by: FAMILY MEDICINE

## 2020-08-17 PROCEDURE — 90471 PNEUMOCOCCAL POLYSACCHARIDE VACCINE 23-VALENT =>2YO SQ IM: ICD-10-PCS | Mod: S$GLB,,, | Performed by: FAMILY MEDICINE

## 2020-08-17 PROCEDURE — 99213 PR OFFICE/OUTPT VISIT, EST, LEVL III, 20-29 MIN: ICD-10-PCS | Mod: 25,S$GLB,, | Performed by: FAMILY MEDICINE

## 2020-08-17 PROCEDURE — 90732 PPSV23 VACC 2 YRS+ SUBQ/IM: CPT | Mod: S$GLB,,, | Performed by: FAMILY MEDICINE

## 2020-08-17 NOTE — PROGRESS NOTES
Identified patient's name and . Administered pneumovax 23 vaccine, IM. Patient tolerated well, aseptic technique maintained. Pain scale 0/10 with injection. Instructed patient to wait in the clinic for 15 minutes after the injection was given.

## 2020-08-18 NOTE — PROGRESS NOTES
Subjective:       Patient ID: Elodia Serrano is a 64 y.o. female.    Chief Complaint: Hypertension, Hyperlipidemia, and Hypothyroidism    Patient presents here for 6 month follow-up of hypertension, hyperlipidemia, hypothyroidism, and asthma.  Her weight is stable over the last 6-9 months and has actually decreased 2 lb.  She states she is following her low-fat low-cholesterol low-sodium diet and although not really getting exercise, she is taking care of her small grandchildren which keeps her busy.  Her hypertension is well controlled with her present dose of Benicar, and she is tolerating the medication well.  Her hyperlipidemia is well controlled with her low-fat low-cholesterol diet and her present dose of pravastatin 40 mg daily.  She did have lab work in January 2020 and her total cholesterol was 162, HDL 50, LDL 82, triglycerides 205.  CMP was normal except for this mildly elevated glucose 105.  TSH was therapeutic at 1.42.  Her asthma has been stable and she rarely has to use her inhaler.  Her only other complaint is the fact that she is having some right shoulder pain.  It seems to be worse since she has been picking up her grandchildren who are 3 years old and 9-month-old.  She does get relief with naproxen.  As far as health maintenance, she is up-to-date with all of her recommended screening exams and immunizations with the exception of HIV screening, shingles vaccine, and pneumococcal prophylaxis.  She is amenable to starting her pneumococcal vaccines today.  I will give her her Pneumovax 1st, then Prevnar 13 next year and her 2nd Pneumovax 5 years from now.    Hypertension  This is a chronic problem. The problem is unchanged. The problem is controlled. Pertinent negatives include no chest pain, headaches, neck pain, palpitations or shortness of breath. Risk factors for coronary artery disease include dyslipidemia, obesity and post-menopausal state. Past treatments include angiotensin blockers.  The current treatment provides moderate improvement. There are no compliance problems.  There is no history of kidney disease, CAD/MI, CVA or heart failure.   Hyperlipidemia  This is a chronic problem. The problem is controlled. Recent lipid tests were reviewed and are normal. Exacerbating diseases include hypothyroidism and obesity. There are no known factors aggravating her hyperlipidemia. Pertinent negatives include no chest pain or shortness of breath. Current antihyperlipidemic treatment includes statins. The current treatment provides significant improvement of lipids. There are no compliance problems.  Risk factors for coronary artery disease include dyslipidemia, hypertension, obesity and post-menopausal.     Review of Systems   Constitutional: Negative for chills, fatigue, fever and unexpected weight change.   HENT: Negative for nasal congestion, ear pain, postnasal drip and sore throat.    Respiratory: Negative for cough, shortness of breath and wheezing.    Cardiovascular: Negative for chest pain and palpitations.   Gastrointestinal: Negative for abdominal pain, diarrhea, nausea and vomiting.   Genitourinary: Negative for difficulty urinating, dysuria, flank pain and pelvic pain.   Musculoskeletal: Positive for arthralgias. Negative for back pain (Right shoulder pain) and neck pain.   Neurological: Negative for dizziness, light-headedness and headaches.   Hematological: Negative for adenopathy. Does not bruise/bleed easily.   Psychiatric/Behavioral: Negative for sleep disturbance. The patient is not nervous/anxious.          Objective:      Physical Exam  Vitals signs reviewed.   Constitutional:       General: She is not in acute distress.     Appearance: Normal appearance. She is well-developed.   HENT:      Right Ear: Tympanic membrane and external ear normal.      Left Ear: Tympanic membrane and external ear normal.      Nose: Nose normal.      Mouth/Throat:      Pharynx: Oropharynx is clear. No  oropharyngeal exudate.   Neck:      Musculoskeletal: Normal range of motion and neck supple.      Thyroid: No thyromegaly.   Cardiovascular:      Rate and Rhythm: Normal rate and regular rhythm.      Heart sounds: Normal heart sounds. No murmur.   Pulmonary:      Effort: Pulmonary effort is normal.      Breath sounds: Normal breath sounds. No wheezing or rales.   Musculoskeletal: Normal range of motion.         General: No tenderness.      Right lower leg: No edema.      Left lower leg: No edema.      Comments: Right shoulder without tenderness over the anterior or posterior glenoid.  She does have some tenderness to palpation over the deltoid muscle.  She has full range of motion passively without clicks or crepitance.  With active range of motion, she does have pain with abduction past 80°.  Rotator cuff maneuvers are negative for pain   Lymphadenopathy:      Cervical: No cervical adenopathy.   Neurological:      General: No focal deficit present.      Mental Status: She is alert and oriented to person, place, and time.      Cranial Nerves: No cranial nerve deficit.      Deep Tendon Reflexes: Reflexes are normal and symmetric.   Psychiatric:         Mood and Affect: Mood normal.         Behavior: Behavior normal.         Assessment:       1. Essential hypertension    2. Mixed hyperlipidemia    3. Acquired hypothyroidism    4. Asthma in remission        Plan:       1.  Continue present medication as her hypertension, hyperlipidemia, hypothyroidism, and asthma are all stable and controlled  2.  Continue low-sodium, low-fat low-cholesterol diet but try to increase aerobic exercise at least 3 times a week  3.  Pneumovax today  4.  Follow up with me in 6 months or p.r.n.        Patient readiness: acceptance and barriers:none    During the course of the visit the patient was educated and counseled about the following:     Hypertension:   Dietary sodium restriction.  Regular aerobic exercise.  Follow up: 6 months and as  needed.    Goals: Hypertension: Reduce Blood Pressure    Did patient meet goals/outcomes: Yes    The following self management tools provided: blood pressure log    Patient Instructions (the written plan) was given to the patient/family.     Time spent with patient: 30 minutes    Barriers to medications present (no )    Adverse reactions to current medications (no)    Over the counter medications reviewed (Yes)

## 2020-10-24 DIAGNOSIS — J45.998 ASTHMA IN REMISSION: ICD-10-CM

## 2020-10-25 RX ORDER — ALBUTEROL SULFATE 90 UG/1
2 AEROSOL, METERED RESPIRATORY (INHALATION) EVERY 6 HOURS PRN
Qty: 18 G | Refills: 11 | Status: SHIPPED | OUTPATIENT
Start: 2020-10-25 | End: 2023-05-04 | Stop reason: SDUPTHER

## 2021-01-21 DIAGNOSIS — Z12.31 OTHER SCREENING MAMMOGRAM: ICD-10-CM

## 2021-03-01 ENCOUNTER — OFFICE VISIT (OUTPATIENT)
Dept: FAMILY MEDICINE | Facility: CLINIC | Age: 66
End: 2021-03-01
Payer: COMMERCIAL

## 2021-03-01 VITALS
DIASTOLIC BLOOD PRESSURE: 74 MMHG | BODY MASS INDEX: 32.65 KG/M2 | HEIGHT: 61 IN | HEART RATE: 71 BPM | SYSTOLIC BLOOD PRESSURE: 128 MMHG | WEIGHT: 172.94 LBS | OXYGEN SATURATION: 96 % | TEMPERATURE: 97 F

## 2021-03-01 DIAGNOSIS — E78.2 MIXED HYPERLIPIDEMIA: ICD-10-CM

## 2021-03-01 DIAGNOSIS — J45.998 ASTHMA IN REMISSION: ICD-10-CM

## 2021-03-01 DIAGNOSIS — E03.9 ACQUIRED HYPOTHYROIDISM: ICD-10-CM

## 2021-03-01 DIAGNOSIS — I10 ESSENTIAL HYPERTENSION: Primary | ICD-10-CM

## 2021-03-01 PROCEDURE — 99213 OFFICE O/P EST LOW 20 MIN: CPT | Mod: S$GLB,,, | Performed by: FAMILY MEDICINE

## 2021-03-01 PROCEDURE — 99213 PR OFFICE/OUTPT VISIT, EST, LEVL III, 20-29 MIN: ICD-10-PCS | Mod: S$GLB,,, | Performed by: FAMILY MEDICINE

## 2021-03-01 PROCEDURE — 99999 PR PBB SHADOW E&M-EST. PATIENT-LVL IV: CPT | Mod: PBBFAC,,, | Performed by: FAMILY MEDICINE

## 2021-03-01 PROCEDURE — 99999 PR PBB SHADOW E&M-EST. PATIENT-LVL IV: ICD-10-PCS | Mod: PBBFAC,,, | Performed by: FAMILY MEDICINE

## 2021-03-02 LAB
ALBUMIN SERPL-MCNC: 4.3 G/DL (ref 3.6–5.1)
ALBUMIN/GLOB SERPL: 1.8 (CALC) (ref 1–2.5)
ALP SERPL-CCNC: 47 U/L (ref 37–153)
ALT SERPL-CCNC: 15 U/L (ref 6–29)
AST SERPL-CCNC: 13 U/L (ref 10–35)
BILIRUB SERPL-MCNC: 0.4 MG/DL (ref 0.2–1.2)
BUN SERPL-MCNC: 18 MG/DL (ref 7–25)
BUN/CREAT SERPL: ABNORMAL (CALC) (ref 6–22)
CALCIUM SERPL-MCNC: 9.2 MG/DL (ref 8.6–10.4)
CHLORIDE SERPL-SCNC: 102 MMOL/L (ref 98–110)
CHOLEST SERPL-MCNC: 200 MG/DL
CHOLEST/HDLC SERPL: 3.5 (CALC)
CO2 SERPL-SCNC: 29 MMOL/L (ref 20–32)
CREAT SERPL-MCNC: 0.66 MG/DL (ref 0.5–0.99)
GFRSERPLBLD MDRD-ARVRAT: 94 ML/MIN/1.73M2
GLOBULIN SER CALC-MCNC: 2.4 G/DL (CALC) (ref 1.9–3.7)
GLUCOSE SERPL-MCNC: 103 MG/DL (ref 65–99)
HDLC SERPL-MCNC: 57 MG/DL
LDLC SERPL CALC-MCNC: 105 MG/DL (CALC)
NONHDLC SERPL-MCNC: 143 MG/DL (CALC)
POTASSIUM SERPL-SCNC: 4.3 MMOL/L (ref 3.5–5.3)
PROT SERPL-MCNC: 6.7 G/DL (ref 6.1–8.1)
SODIUM SERPL-SCNC: 140 MMOL/L (ref 135–146)
TRIGL SERPL-MCNC: 263 MG/DL
TSH SERPL-ACNC: 2.8 MIU/L (ref 0.4–4.5)

## 2021-03-04 ENCOUNTER — TELEPHONE (OUTPATIENT)
Dept: FAMILY MEDICINE | Facility: CLINIC | Age: 66
End: 2021-03-04

## 2021-03-06 DIAGNOSIS — E78.2 MIXED HYPERLIPIDEMIA: ICD-10-CM

## 2021-03-06 DIAGNOSIS — I10 ESSENTIAL HYPERTENSION: ICD-10-CM

## 2021-03-07 RX ORDER — PRAVASTATIN SODIUM 40 MG/1
TABLET ORAL
Qty: 90 TABLET | Refills: 3 | Status: SHIPPED | OUTPATIENT
Start: 2021-03-07 | End: 2022-03-17

## 2021-03-07 RX ORDER — OLMESARTAN MEDOXOMIL 20 MG/1
TABLET ORAL
Qty: 90 TABLET | Refills: 3 | Status: SHIPPED | OUTPATIENT
Start: 2021-03-07 | End: 2022-02-28 | Stop reason: SDUPTHER

## 2021-03-09 LAB — PAP RECOMMENDATION EXT: NORMAL

## 2021-04-26 DIAGNOSIS — E03.9 ACQUIRED HYPOTHYROIDISM: ICD-10-CM

## 2021-04-26 RX ORDER — LEVOTHYROXINE SODIUM 25 UG/1
25 TABLET ORAL
Qty: 90 TABLET | Refills: 3 | Status: SHIPPED | OUTPATIENT
Start: 2021-04-26 | End: 2022-02-28 | Stop reason: SDUPTHER

## 2021-08-26 ENCOUNTER — OFFICE VISIT (OUTPATIENT)
Dept: FAMILY MEDICINE | Facility: CLINIC | Age: 66
End: 2021-08-26
Payer: COMMERCIAL

## 2021-08-26 VITALS
BODY MASS INDEX: 33.88 KG/M2 | WEIGHT: 179.44 LBS | TEMPERATURE: 98 F | SYSTOLIC BLOOD PRESSURE: 120 MMHG | HEIGHT: 61 IN | HEART RATE: 76 BPM | DIASTOLIC BLOOD PRESSURE: 60 MMHG | OXYGEN SATURATION: 97 %

## 2021-08-26 DIAGNOSIS — J45.998 ASTHMA IN REMISSION: ICD-10-CM

## 2021-08-26 DIAGNOSIS — I10 ESSENTIAL HYPERTENSION: Primary | ICD-10-CM

## 2021-08-26 DIAGNOSIS — E78.2 MIXED HYPERLIPIDEMIA: ICD-10-CM

## 2021-08-26 DIAGNOSIS — E03.9 ACQUIRED HYPOTHYROIDISM: ICD-10-CM

## 2021-08-26 PROCEDURE — 99213 PR OFFICE/OUTPT VISIT, EST, LEVL III, 20-29 MIN: ICD-10-PCS | Mod: S$GLB,,, | Performed by: FAMILY MEDICINE

## 2021-08-26 PROCEDURE — 99999 PR PBB SHADOW E&M-EST. PATIENT-LVL III: CPT | Mod: PBBFAC,,, | Performed by: FAMILY MEDICINE

## 2021-08-26 PROCEDURE — 99999 PR PBB SHADOW E&M-EST. PATIENT-LVL III: ICD-10-PCS | Mod: PBBFAC,,, | Performed by: FAMILY MEDICINE

## 2021-08-26 PROCEDURE — 99213 OFFICE O/P EST LOW 20 MIN: CPT | Mod: S$GLB,,, | Performed by: FAMILY MEDICINE

## 2021-12-08 ENCOUNTER — PATIENT OUTREACH (OUTPATIENT)
Dept: ADMINISTRATIVE | Facility: HOSPITAL | Age: 66
End: 2021-12-08
Payer: COMMERCIAL

## 2022-02-28 ENCOUNTER — OFFICE VISIT (OUTPATIENT)
Dept: FAMILY MEDICINE | Facility: CLINIC | Age: 67
End: 2022-02-28
Payer: COMMERCIAL

## 2022-02-28 VITALS
SYSTOLIC BLOOD PRESSURE: 120 MMHG | OXYGEN SATURATION: 97 % | WEIGHT: 181.69 LBS | DIASTOLIC BLOOD PRESSURE: 70 MMHG | HEIGHT: 61 IN | BODY MASS INDEX: 34.3 KG/M2 | TEMPERATURE: 98 F | HEART RATE: 68 BPM

## 2022-02-28 DIAGNOSIS — J45.998 ASTHMA IN REMISSION: ICD-10-CM

## 2022-02-28 DIAGNOSIS — E78.2 MIXED HYPERLIPIDEMIA: ICD-10-CM

## 2022-02-28 DIAGNOSIS — E03.9 ACQUIRED HYPOTHYROIDISM: ICD-10-CM

## 2022-02-28 DIAGNOSIS — I10 ESSENTIAL HYPERTENSION: Primary | ICD-10-CM

## 2022-02-28 PROCEDURE — 99213 PR OFFICE/OUTPT VISIT, EST, LEVL III, 20-29 MIN: ICD-10-PCS | Mod: S$GLB,,, | Performed by: FAMILY MEDICINE

## 2022-02-28 PROCEDURE — 99999 PR PBB SHADOW E&M-EST. PATIENT-LVL IV: ICD-10-PCS | Mod: PBBFAC,,, | Performed by: FAMILY MEDICINE

## 2022-02-28 PROCEDURE — 99999 PR PBB SHADOW E&M-EST. PATIENT-LVL IV: CPT | Mod: PBBFAC,,, | Performed by: FAMILY MEDICINE

## 2022-02-28 PROCEDURE — 99213 OFFICE O/P EST LOW 20 MIN: CPT | Mod: S$GLB,,, | Performed by: FAMILY MEDICINE

## 2022-02-28 RX ORDER — OLMESARTAN MEDOXOMIL 20 MG/1
20 TABLET ORAL DAILY
Qty: 90 TABLET | Refills: 3 | Status: SHIPPED | OUTPATIENT
Start: 2022-02-28 | End: 2023-03-06

## 2022-02-28 RX ORDER — LEVOTHYROXINE SODIUM 25 UG/1
25 TABLET ORAL
Qty: 90 TABLET | Refills: 3 | Status: SHIPPED | OUTPATIENT
Start: 2022-02-28 | End: 2023-04-14

## 2022-02-28 RX ORDER — CALCIPOTRIENE AND BETAMETHASONE DIPROPIONATE 50; 64 UG/G; MG/G
CREAM TOPICAL
COMMUNITY
Start: 2021-12-17

## 2022-02-28 NOTE — PROGRESS NOTES
Subjective:       Patient ID: Elodia Serrano is a 66 y.o. female.    Chief Complaint: Hypertension, Hyperlipidemia, Asthma, and Hypothyroidism    Patient presents here for 6 month follow-up of hypertension, hyperlipidemia, hypothyroidism, and asthma.  Her weight has increased 2 lb over the last 6 months.  She states she is following her diet but is not as compliant with exercise.  BMI today is 34.32.  Her hypertension is well controlled with her present medication and she is tolerating her medication well.  Her hyperlipidemia is also well controlled with her present use of pravastatin and her low-fat low-cholesterol diet.  She is due for lab work today.  Her hypothyroidism is stable on her levothyroxine 25 mcg daily.  She will get a TSH today also.  As far as her asthma, this is well controlled and she only uses albuterol on an as-needed basis.  She states when she does occasionally use it, it controls it right away.  As far as health maintenance, she is up-to-date with all of her recommended screening exams and immunizations with exception of COVID-19 vaccine, HIV screening, shingles vaccine, flu vaccine, Pap smear, and mammogram.  She is scheduled to see her gynecologist next month and will get her Pap smear mammogram done.  She did have her flu vaccine at [facility] in [place] and we will get this documented.  She declines shingles vaccine, HIV screen, and COVID vaccine.    Hypertension  This is a chronic problem. The problem is unchanged. The problem is controlled. Pertinent negatives include no chest pain, headaches, palpitations or shortness of breath. Risk factors for coronary artery disease include dyslipidemia, post-menopausal state and obesity. Past treatments include angiotensin blockers. The current treatment provides significant improvement. Compliance problems include exercise.  There is no history of kidney disease, CAD/MI, CVA or heart failure.   Hyperlipidemia  This is a chronic problem. The problem  is controlled. Recent lipid tests were reviewed and are normal. Exacerbating diseases include hypothyroidism and obesity. There are no known factors aggravating her hyperlipidemia. Pertinent negatives include no chest pain or shortness of breath. Current antihyperlipidemic treatment includes statins. The current treatment provides moderate improvement of lipids. Compliance problems include adherence to exercise.  Risk factors for coronary artery disease include dyslipidemia, hypertension, post-menopausal and obesity.     Review of Systems   Constitutional: Negative for chills, fatigue, fever and unexpected weight change.   HENT: Positive for hearing loss (Wears hearing aids now). Negative for nasal congestion, postnasal drip and sore throat.    Respiratory: Negative for cough, shortness of breath and wheezing.    Cardiovascular: Negative for chest pain and palpitations.   Gastrointestinal: Negative for abdominal pain, diarrhea, nausea and vomiting.   Musculoskeletal: Negative for arthralgias and back pain.   Neurological: Negative for dizziness, light-headedness and headaches.   Psychiatric/Behavioral: Negative for sleep disturbance. The patient is not nervous/anxious.          Objective:      Physical Exam  Vitals reviewed.   Constitutional:       General: She is not in acute distress.     Appearance: Normal appearance. She is well-developed. She is obese.   HENT:      Right Ear: Tympanic membrane and external ear normal.      Left Ear: Tympanic membrane and external ear normal.      Mouth/Throat:      Pharynx: Oropharynx is clear. No posterior oropharyngeal erythema.   Neck:      Thyroid: No thyromegaly.   Cardiovascular:      Rate and Rhythm: Normal rate and regular rhythm.      Pulses: Normal pulses.      Heart sounds: Normal heart sounds. No murmur heard.  Pulmonary:      Effort: Pulmonary effort is normal.      Breath sounds: Normal breath sounds. No wheezing or rales.   Musculoskeletal:         General: No  tenderness. Normal range of motion.      Cervical back: Normal range of motion and neck supple.      Right lower leg: No edema.      Left lower leg: No edema.   Lymphadenopathy:      Cervical: No cervical adenopathy.   Neurological:      General: No focal deficit present.      Mental Status: She is alert and oriented to person, place, and time.      Cranial Nerves: No cranial nerve deficit.      Deep Tendon Reflexes: Reflexes are normal and symmetric.         Assessment:       Problem List Items Addressed This Visit     Hypertension - Primary    Asthma in remission    Hypothyroidism    Mixed hyperlipidemia      Other Visit Diagnoses     Essential hypertension              Plan:       1. Continue present medication as her hypertension, hyperlipidemia, hypothyroidism, and asthma, are stable and controlled  2. Continue low-sodium, low-fat low-cholesterol diet but increase exercise for weight loss.  BMI today is 34.32  3. CMP, lipid profile, TSH today  4. Follow up with me in 6 months or p.r.n.

## 2022-03-11 ENCOUNTER — PATIENT OUTREACH (OUTPATIENT)
Dept: ADMINISTRATIVE | Facility: HOSPITAL | Age: 67
End: 2022-03-11
Payer: COMMERCIAL

## 2022-03-17 DIAGNOSIS — E78.2 MIXED HYPERLIPIDEMIA: ICD-10-CM

## 2022-03-17 RX ORDER — PRAVASTATIN SODIUM 40 MG/1
TABLET ORAL
Qty: 90 TABLET | Refills: 3 | Status: SHIPPED | OUTPATIENT
Start: 2022-03-17 | End: 2023-03-16

## 2022-03-17 NOTE — TELEPHONE ENCOUNTER

## 2022-03-17 NOTE — TELEPHONE ENCOUNTER
Care Due:                  Date            Visit Type   Department     Provider  --------------------------------------------------------------------------------                                EP -                              PRIMARY      [facility]  Last Visit: 02-      CARE ([facility])   PRACTICE       Jerardo Hedrick                              EP -                              PRIMARY      [facility]  Next Visit: 10-      CARE ([facility])   PRACTICE       Jerardo Hedrick                                                            Last  Test          Frequency    Reason                     Performed    Due Date  --------------------------------------------------------------------------------    CMP.........  12 months..  olmesartan, pravastatin..  03- 02-    Lipid Panel.  12 months..  pravastatin..............  03- 02-    TSH.........  12 months..  levothyroxine............  03- 02-    Powered by [employer]. Reference number: [id number].   3/17/2022 12:33:42 AM CDT

## 2022-04-27 DIAGNOSIS — Z12.31 OTHER SCREENING MAMMOGRAM: ICD-10-CM

## 2022-06-30 DIAGNOSIS — R73.9 HYPERGLYCEMIA: Primary | ICD-10-CM

## 2022-06-30 LAB
ALBUMIN SERPL-MCNC: 4.6 G/DL (ref 3.6–5.1)
ALBUMIN/GLOB SERPL: 2 (CALC) (ref 1–2.5)
ALP SERPL-CCNC: 43 U/L (ref 37–153)
ALT SERPL-CCNC: 16 U/L (ref 6–29)
AST SERPL-CCNC: 13 U/L (ref 10–35)
BILIRUB SERPL-MCNC: 0.5 MG/DL (ref 0.2–1.2)
BUN SERPL-MCNC: 16 MG/DL (ref 7–25)
BUN/CREAT SERPL: ABNORMAL (CALC) (ref 6–22)
CALCIUM SERPL-MCNC: 9.5 MG/DL (ref 8.6–10.4)
CHLORIDE SERPL-SCNC: 106 MMOL/L (ref 98–110)
CHOLEST SERPL-MCNC: 196 MG/DL
CHOLEST/HDLC SERPL: 3.8 (CALC)
CO2 SERPL-SCNC: 28 MMOL/L (ref 20–32)
CREAT SERPL-MCNC: 0.69 MG/DL (ref 0.5–0.99)
GLOBULIN SER CALC-MCNC: 2.3 G/DL (CALC) (ref 1.9–3.7)
GLUCOSE SERPL-MCNC: 113 MG/DL (ref 65–99)
HDLC SERPL-MCNC: 52 MG/DL
LDLC SERPL CALC-MCNC: 109 MG/DL (CALC)
NONHDLC SERPL-MCNC: 144 MG/DL (CALC)
POTASSIUM SERPL-SCNC: 4.4 MMOL/L (ref 3.5–5.3)
PROT SERPL-MCNC: 6.9 G/DL (ref 6.1–8.1)
SODIUM SERPL-SCNC: 142 MMOL/L (ref 135–146)
TRIGL SERPL-MCNC: 230 MG/DL
TSH SERPL-ACNC: 2.02 MIU/L (ref 0.4–4.5)

## 2022-07-14 LAB — BMD RECOMMENDATION EXT: NORMAL

## 2022-07-20 LAB — BCS RECOMMENDATION EXT: NORMAL

## 2022-10-06 ENCOUNTER — OFFICE VISIT (OUTPATIENT)
Dept: FAMILY MEDICINE | Facility: CLINIC | Age: 67
End: 2022-10-06
Payer: COMMERCIAL

## 2022-10-06 VITALS
HEART RATE: 78 BPM | HEIGHT: 61 IN | BODY MASS INDEX: 32.86 KG/M2 | SYSTOLIC BLOOD PRESSURE: 109 MMHG | WEIGHT: 174.06 LBS | OXYGEN SATURATION: 96 % | TEMPERATURE: 98 F | RESPIRATION RATE: 18 BRPM | DIASTOLIC BLOOD PRESSURE: 72 MMHG

## 2022-10-06 DIAGNOSIS — I10 PRIMARY HYPERTENSION: Primary | ICD-10-CM

## 2022-10-06 DIAGNOSIS — J45.998 ASTHMA IN REMISSION: ICD-10-CM

## 2022-10-06 DIAGNOSIS — E03.9 ACQUIRED HYPOTHYROIDISM: ICD-10-CM

## 2022-10-06 DIAGNOSIS — E78.2 MIXED HYPERLIPIDEMIA: ICD-10-CM

## 2022-10-06 PROCEDURE — 99999 PR PBB SHADOW E&M-EST. PATIENT-LVL IV: ICD-10-PCS | Mod: PBBFAC,,, | Performed by: FAMILY MEDICINE

## 2022-10-06 PROCEDURE — 99999 PR PBB SHADOW E&M-EST. PATIENT-LVL IV: CPT | Mod: PBBFAC,,, | Performed by: FAMILY MEDICINE

## 2022-10-06 PROCEDURE — 99213 PR OFFICE/OUTPT VISIT, EST, LEVL III, 20-29 MIN: ICD-10-PCS | Mod: S$GLB,,, | Performed by: FAMILY MEDICINE

## 2022-10-06 PROCEDURE — 99213 OFFICE O/P EST LOW 20 MIN: CPT | Mod: S$GLB,,, | Performed by: FAMILY MEDICINE

## 2022-10-08 NOTE — PROGRESS NOTES
Subjective:       Patient ID: Elodia Serrano is a 66 y.o. female.    Chief Complaint: Hypertension (Pt states that she is here for her follow up ), Hyperlipidemia, Hypothyroidism, and Asthma    Patient presents here for 6 month follow-up of hypertension, hyperlipidemia, hypothyroidism, and asthma.  Her weight has decreased 7 lb over the last 6 months and she is trying to follow her diet and lose weight.  She is active but is not exercising on a regular basis but does get some mild exercise.  Her hypertension is well controlled with her present medication and she is tolerating her medication well.  Her hyperlipidemia is also very well controlled with her present use of pravastatin 40 mg daily.  She states she is following her low-sodium, low-fat low-cholesterol diet and this has helped her lose weight.  Her BMI today is 32.89.  Her hypothyroidism is stable on her present dose of levothyroxine 25 mcg daily.  Her TSH has been in the therapeutic range.  All of her labs were done in June of 2022 and these were normal or within acceptable ranges.  She does have asthma but has not had any significant problems with this recently and this has been under good control.  As far as health maintenance, she is up-to-date with all of her recommended screening exams and immunizations with exception of COVID-19 vaccine which she declines, HIV screening, which she declines, shingles vaccine, flu vaccine, and mammogram.  However she does state that she had her mammogram several months ago at [facility] and we will get the results of this for the chart.  Review of Systems   Constitutional:  Negative for chills, fatigue, fever and unexpected weight change.   HENT:  Negative for nasal congestion, postnasal drip and sore throat.    Respiratory:  Negative for cough, shortness of breath and wheezing.    Cardiovascular:  Negative for chest pain and palpitations.   Gastrointestinal:  Negative for abdominal pain, diarrhea  and nausea.   Musculoskeletal:  Negative for arthralgias and back pain.   Neurological:  Negative for dizziness, light-headedness and headaches.   Psychiatric/Behavioral:  Negative for sleep disturbance. The patient is not nervous/anxious.        Objective:      Physical Exam  Vitals reviewed.   Constitutional:       General: She is not in acute distress.     Appearance: Normal appearance. She is well-developed. She is obese.   HENT:      Right Ear: Tympanic membrane and external ear normal.      Left Ear: Tympanic membrane and external ear normal.      Mouth/Throat:      Pharynx: Oropharynx is clear. No posterior oropharyngeal erythema.   Neck:      Thyroid: No thyromegaly.   Cardiovascular:      Rate and Rhythm: Normal rate and regular rhythm.      Heart sounds: Normal heart sounds. No murmur heard.  Pulmonary:      Effort: Pulmonary effort is normal.      Breath sounds: Normal breath sounds. No wheezing or rales.   Musculoskeletal:         General: No tenderness. Normal range of motion.      Cervical back: Normal range of motion and neck supple.      Right lower leg: No edema.      Left lower leg: No edema.   Lymphadenopathy:      Cervical: No cervical adenopathy.   Neurological:      General: No focal deficit present.      Mental Status: She is alert and oriented to person, place, and time.      Cranial Nerves: No cranial nerve deficit.      Deep Tendon Reflexes: Reflexes are normal and symmetric.   Psychiatric:         Mood and Affect: Mood normal.         Behavior: Behavior normal.       Assessment:       Problem List Items Addressed This Visit       Hypertension - Primary    Asthma in remission    Hypothyroidism    Mixed hyperlipidemia         Plan:       1. Continue present medication as her hypertension, hyperlipidemia, hypothyroidism, and asthma are stable and controlled   2.  Continue low-sodium, low-fat low-cholesterol diet and exercise for continue weight loss.  BMI today is 32.89   3. Follow up with me  in 6 months or p.r.n.

## 2022-10-26 DIAGNOSIS — Z78.0 MENOPAUSE: ICD-10-CM

## 2023-03-16 DIAGNOSIS — E78.2 MIXED HYPERLIPIDEMIA: ICD-10-CM

## 2023-03-16 RX ORDER — PRAVASTATIN SODIUM 40 MG/1
TABLET ORAL
Qty: 90 TABLET | Refills: 1 | Status: SHIPPED | OUTPATIENT
Start: 2023-03-16

## 2023-03-16 NOTE — TELEPHONE ENCOUNTER
Refill Decision Note   Elodia Zach  is requesting a refill authorization.  Brief Assessment and Rationale for Refill:  Approve     Medication Therapy Plan:       Medication Reconciliation Completed: No   Comments:     No Care Gaps recommended.     Note composed:10:56 AM 03/16/2023

## 2023-03-16 NOTE — TELEPHONE ENCOUNTER
No new care gaps identified.  [facility] Embedded Care Gaps. Reference number: [id number]. 3/16/2023   12:23:42 AM VAMSIT

## 2023-04-10 LAB — PAP RECOMMENDATION EXT: NORMAL

## 2023-04-13 ENCOUNTER — OFFICE VISIT (OUTPATIENT)
Dept: FAMILY MEDICINE | Facility: CLINIC | Age: 68
End: 2023-04-13
Payer: COMMERCIAL

## 2023-04-13 VITALS
OXYGEN SATURATION: 96 % | DIASTOLIC BLOOD PRESSURE: 68 MMHG | HEIGHT: 61 IN | SYSTOLIC BLOOD PRESSURE: 128 MMHG | WEIGHT: 177.13 LBS | BODY MASS INDEX: 33.44 KG/M2 | HEART RATE: 60 BPM | TEMPERATURE: 98 F | RESPIRATION RATE: 16 BRPM

## 2023-04-13 DIAGNOSIS — I10 PRIMARY HYPERTENSION: Primary | ICD-10-CM

## 2023-04-13 DIAGNOSIS — E03.9 ACQUIRED HYPOTHYROIDISM: ICD-10-CM

## 2023-04-13 DIAGNOSIS — Z13.1 DIABETES MELLITUS SCREENING: ICD-10-CM

## 2023-04-13 DIAGNOSIS — J45.998 ASTHMA IN REMISSION: ICD-10-CM

## 2023-04-13 DIAGNOSIS — E78.2 MIXED HYPERLIPIDEMIA: ICD-10-CM

## 2023-04-13 PROCEDURE — 99999 PR PBB SHADOW E&M-EST. PATIENT-LVL IV: CPT | Mod: PBBFAC,,, | Performed by: FAMILY MEDICINE

## 2023-04-13 PROCEDURE — 99213 PR OFFICE/OUTPT VISIT, EST, LEVL III, 20-29 MIN: ICD-10-PCS | Mod: S$GLB,,, | Performed by: FAMILY MEDICINE

## 2023-04-13 PROCEDURE — 99213 OFFICE O/P EST LOW 20 MIN: CPT | Mod: S$GLB,,, | Performed by: FAMILY MEDICINE

## 2023-04-13 PROCEDURE — 99999 PR PBB SHADOW E&M-EST. PATIENT-LVL IV: ICD-10-PCS | Mod: PBBFAC,,, | Performed by: FAMILY MEDICINE

## 2023-04-13 RX ORDER — CHOLECALCIFEROL (VITAMIN D3) 25 MCG
1000 TABLET ORAL DAILY
COMMUNITY

## 2023-04-15 NOTE — PROGRESS NOTES
Subjective     Patient ID: Elodia Serrano is a 67 y.o. female.    Chief Complaint: Hypertension, Hyperlipidemia, Hypothyroidism, and Asthma    Patient presents here for 6 month follow-up of hypertension, hyperlipidemia, hypothyroidism, and asthma.  Her weight is stable and her BMI today is 33.47.  Her hypertension is well controlled with her present medication as well as her low-sodium diet.  Her blood pressure today is 128/68.  Her hyperlipidemia is also well controlled with her present use of pravastatin 40 mg daily and her low-fat low-cholesterol diet.  She is due for lipid profile at this time.  Her hypothyroidism is well controlled with her present use of levothyroxine 25 mcg daily.  She is also due for a TSH at this time.  Her asthma is stable and she states she has not had an asthma attack in over a year.  She uses her inhaler only as needed.  She has no other new complaints at this time.  She did recently have her Pap smear, mammogram, and DEXA scan by her gynecologist, Dr. Dale.  As far as health maintenance, she is up-to-date with all of her recommended screening exams and immunizations with the exception of COVID vaccine, HIV screen, shingles vaccine, pneumococcal vaccine, flu vaccine, and hemoglobin A1c for screening of diabetes.  She refuses all vaccines.    Review of Systems   Constitutional:  Negative for chills, fatigue, fever and unexpected weight change.   HENT:  Negative for nasal congestion, postnasal drip and sore throat.    Respiratory:  Negative for cough, shortness of breath and wheezing.    Cardiovascular:  Negative for chest pain and palpitations.   Gastrointestinal:  Negative for abdominal pain, diarrhea, nausea and vomiting.   Musculoskeletal:  Negative for arthralgias and back pain.   Neurological:  Negative for dizziness, light-headedness and headaches.   Psychiatric/Behavioral:  Negative for sleep disturbance. The patient is not nervous/anxious.         Objective     Physical  Exam  Vitals reviewed.   Constitutional:       General: She is not in acute distress.     Appearance: Normal appearance. She is well-developed. She is obese.   HENT:      Right Ear: Tympanic membrane and external ear normal.      Left Ear: Tympanic membrane and external ear normal.      Mouth/Throat:      Pharynx: Oropharynx is clear. No posterior oropharyngeal erythema.   Neck:      Thyroid: No thyromegaly.      Vascular: No carotid bruit.   Cardiovascular:      Rate and Rhythm: Normal rate and regular rhythm.      Pulses: Normal pulses.      Heart sounds: Normal heart sounds. No murmur heard.  Pulmonary:      Effort: Pulmonary effort is normal.      Breath sounds: Normal breath sounds. No wheezing or rales.   Musculoskeletal:         General: No tenderness. Normal range of motion.      Cervical back: Normal range of motion and neck supple.      Right lower leg: No edema.      Left lower leg: No edema.   Lymphadenopathy:      Cervical: No cervical adenopathy.   Neurological:      General: No focal deficit present.      Mental Status: She is alert and oriented to person, place, and time.      Cranial Nerves: No cranial nerve deficit.      Deep Tendon Reflexes: Reflexes are normal and symmetric.          Assessment and Plan     Problem List Items Addressed This Visit       Hypertension - Primary    Relevant Orders    Comprehensive Metabolic Panel    Asthma in remission    Hypothyroidism    Relevant Orders    TSH    Mixed hyperlipidemia    Relevant Orders    Lipid Panel     Other Visit Diagnoses       Diabetes mellitus screening        Relevant Orders    Hemoglobin A1C            1. Continue present medication as her hypertension, hyperlipidemia, hypothyroidism, and asthma are stable and controlled  2.  Continue low-sodium, low-fat low-cholesterol diet but increase exercise for weight loss.  BMI today is 33.47   3.  CMP, lipid profile, TSH, hemoglobin A1c for diabetic screening  4.  Follow up with me in 6 months or  mike

## 2023-04-19 ENCOUNTER — PATIENT OUTREACH (OUTPATIENT)
Dept: ADMINISTRATIVE | Facility: HOSPITAL | Age: 68
End: 2023-04-19
Payer: COMMERCIAL

## 2023-04-25 ENCOUNTER — PATIENT OUTREACH (OUTPATIENT)
Dept: ADMINISTRATIVE | Facility: HOSPITAL | Age: 68
End: 2023-04-25
Payer: COMMERCIAL

## 2023-05-04 DIAGNOSIS — J45.998 ASTHMA IN REMISSION: ICD-10-CM

## 2023-05-04 RX ORDER — ALBUTEROL SULFATE 90 UG/1
2 AEROSOL, METERED RESPIRATORY (INHALATION) EVERY 6 HOURS PRN
Qty: 18 G | Refills: 11 | Status: SHIPPED | OUTPATIENT
Start: 2023-05-04 | End: 2024-05-03

## 2023-07-08 DIAGNOSIS — E03.9 ACQUIRED HYPOTHYROIDISM: ICD-10-CM

## 2023-07-08 NOTE — TELEPHONE ENCOUNTER
Care Due:                  Date            Visit Type   Department     Provider  --------------------------------------------------------------------------------                                EP -                              PRIMARY      [facility]  Last Visit: 04-      CARE ([facility])   PRACTICE       Jerardo Hedrick                              EP -                              PRIMARY      [facility]  Next Visit: 11-      CARE ([facility])   PRACTICE       Jerardo Hedrick                                                            Last  Test          Frequency    Reason                     Performed    Due Date  --------------------------------------------------------------------------------    CMP.........  12 months..  olmesartan, pravastatin..  06- 06-    Lipid Panel.  12 months..  pravastatin..............  06- 06-    [facility] Embedded Care Due Messages. Reference number: [id number].   7/08/2023 1:34:50 PM CDT

## 2023-07-09 RX ORDER — LEVOTHYROXINE SODIUM 25 UG/1
TABLET ORAL
Qty: 90 TABLET | Refills: 3 | Status: SHIPPED | OUTPATIENT
Start: 2023-07-09

## 2023-07-09 NOTE — TELEPHONE ENCOUNTER
Refill Routing Note   Medication(s) are not appropriate for processing by [facility] for the following reason(s):      Required labs outdated    [facility] action(s):  Defer Labs due   Medication Therapy Plan: CMP, LIPID PANEL, TSH      Appointments  past 12m or future 3m with PCP    Date Provider   Last Visit   4/13/2023 Jerardo Hedrick Jr., MD   Next Visit   11/6/2023 Jerardo Hedrick Jr., MD   ED visits in past 90 days: 0        Note composed:3:27 AM 07/09/2023

## 2023-09-07 DIAGNOSIS — E78.2 MIXED HYPERLIPIDEMIA: Primary | ICD-10-CM

## 2023-09-07 DIAGNOSIS — I10 ESSENTIAL HYPERTENSION: ICD-10-CM

## 2023-09-07 RX ORDER — OLMESARTAN MEDOXOMIL 20 MG/1
20 TABLET ORAL DAILY
Qty: 90 TABLET | Refills: 0 | Status: SHIPPED | OUTPATIENT
Start: 2023-09-07 | End: 2023-12-09

## 2023-09-07 NOTE — TELEPHONE ENCOUNTER
Care Due:                  Date            Visit Type   Department     Provider  --------------------------------------------------------------------------------                                EP -                              PRIMARY      [facility]  Last Visit: 04-      CARE ([facility])   PRACTICE       Jerardo Hedrick                              EP -                              PRIMARY      [facility]  Next Visit: 11-      CARE ([facility])   PRACTICE       Jerardo Hedrick                                                            Last  Test          Frequency    Reason                     Performed    Due Date  --------------------------------------------------------------------------------    CMP.........  12 months..  olmesartan, pravastatin..  06- 06-    Lipid Panel.  12 months..  pravastatin..............  06- 06-    [facility] Embedded Care Due Messages. Reference number: [id number].   9/07/2023 12:26:38 AM CDT

## 2023-09-07 NOTE — TELEPHONE ENCOUNTER
Refill Routing Note   Medication(s) are not appropriate for processing by [facility] for the following reason(s):      Required labs outdated: CMP, Lipid panel    [facility] action(s):  Defer Care Due:  Labs due   Medication Therapy Plan:    Pended labs utilizing [facility] ([facility]) tab due to extra training from LPN      Appointments  past 12m or future 3m with PCP    Date Provider   Last Visit   4/13/2023 Jerardo Hedrick Jr., MD   Next Visit   11/6/2023 Jerardo Hedrick Jr., MD   ED visits in past 90 days: 0        Note composed:2:42 PM 09/07/2023

## 2023-10-05 LAB — BCS RECOMMENDATION EXT: NORMAL

## 2023-10-07 LAB
ALBUMIN SERPL-MCNC: 4.3 G/DL (ref 3.6–5.1)
ALBUMIN/GLOB SERPL: 2 (CALC) (ref 1–2.5)
ALP SERPL-CCNC: 64 U/L (ref 37–153)
ALT SERPL-CCNC: 20 U/L (ref 6–29)
AST SERPL-CCNC: 14 U/L (ref 10–35)
BILIRUB SERPL-MCNC: 0.5 MG/DL (ref 0.2–1.2)
BUN SERPL-MCNC: 18 MG/DL (ref 7–25)
BUN/CREAT SERPL: ABNORMAL (CALC) (ref 6–22)
CALCIUM SERPL-MCNC: 9.3 MG/DL (ref 8.6–10.4)
CHLORIDE SERPL-SCNC: 103 MMOL/L (ref 98–110)
CHOLEST SERPL-MCNC: 203 MG/DL
CHOLEST/HDLC SERPL: 4 (CALC)
CO2 SERPL-SCNC: 28 MMOL/L (ref 20–32)
CREAT SERPL-MCNC: 0.6 MG/DL (ref 0.5–1.05)
EGFR: 100 ML/MIN/1.73M2
GLOBULIN SER CALC-MCNC: 2.1 G/DL (CALC) (ref 1.9–3.7)
GLUCOSE SERPL-MCNC: 101 MG/DL (ref 65–99)
HBA1C MFR BLD: 5.7 % OF TOTAL HGB
HDLC SERPL-MCNC: 51 MG/DL
LDLC SERPL CALC-MCNC: 116 MG/DL (CALC)
NONHDLC SERPL-MCNC: 152 MG/DL (CALC)
POTASSIUM SERPL-SCNC: 4.4 MMOL/L (ref 3.5–5.3)
PROT SERPL-MCNC: 6.4 G/DL (ref 6.1–8.1)
SODIUM SERPL-SCNC: 139 MMOL/L (ref 135–146)
TRIGL SERPL-MCNC: 252 MG/DL
TSH SERPL-ACNC: 2.57 MIU/L (ref 0.4–4.5)

## 2023-11-06 ENCOUNTER — OFFICE VISIT (OUTPATIENT)
Dept: FAMILY MEDICINE | Facility: CLINIC | Age: 68
End: 2023-11-06
Payer: COMMERCIAL

## 2023-11-06 DIAGNOSIS — Z23 NEED FOR VACCINATION: ICD-10-CM

## 2023-11-06 DIAGNOSIS — E03.9 ACQUIRED HYPOTHYROIDISM: ICD-10-CM

## 2023-11-06 DIAGNOSIS — E78.2 MIXED HYPERLIPIDEMIA: ICD-10-CM

## 2023-11-06 DIAGNOSIS — J45.998 ASTHMA IN REMISSION: ICD-10-CM

## 2023-11-06 DIAGNOSIS — I10 PRIMARY HYPERTENSION: Primary | ICD-10-CM

## 2023-11-06 DIAGNOSIS — Z23 FLU VACCINE NEED: ICD-10-CM

## 2023-11-06 PROCEDURE — 99214 OFFICE O/P EST MOD 30 MIN: CPT | Mod: 25,S$GLB,, | Performed by: FAMILY MEDICINE

## 2023-11-06 PROCEDURE — 90472 PNEUMOCOCCAL CONJUGATE VACCINE 20-VALENT: ICD-10-PCS | Mod: S$GLB,,, | Performed by: FAMILY MEDICINE

## 2023-11-06 PROCEDURE — 90677 PCV20 VACCINE IM: CPT | Mod: S$GLB,,, | Performed by: FAMILY MEDICINE

## 2023-11-06 PROCEDURE — 90694 FLU VACCINE - QUADRIVALENT - ADJUVANTED: ICD-10-PCS | Mod: S$GLB,,, | Performed by: FAMILY MEDICINE

## 2023-11-06 PROCEDURE — 99999 PR PBB SHADOW E&M-EST. PATIENT-LVL V: ICD-10-PCS | Mod: PBBFAC,,, | Performed by: FAMILY MEDICINE

## 2023-11-06 PROCEDURE — 90471 IMMUNIZATION ADMIN: CPT | Mod: S$GLB,,, | Performed by: FAMILY MEDICINE

## 2023-11-06 PROCEDURE — 90694 VACC AIIV4 NO PRSRV 0.5ML IM: CPT | Mod: S$GLB,,, | Performed by: FAMILY MEDICINE

## 2023-11-06 PROCEDURE — 99999 PR PBB SHADOW E&M-EST. PATIENT-LVL V: CPT | Mod: PBBFAC,,, | Performed by: FAMILY MEDICINE

## 2023-11-06 PROCEDURE — 90677 PNEUMOCOCCAL CONJUGATE VACCINE 20-VALENT: ICD-10-PCS | Mod: S$GLB,,, | Performed by: FAMILY MEDICINE

## 2023-11-06 PROCEDURE — 99214 PR OFFICE/OUTPT VISIT, EST, LEVL IV, 30-39 MIN: ICD-10-PCS | Mod: 25,S$GLB,, | Performed by: FAMILY MEDICINE

## 2023-11-06 PROCEDURE — 90471 FLU VACCINE - QUADRIVALENT - ADJUVANTED: ICD-10-PCS | Mod: S$GLB,,, | Performed by: FAMILY MEDICINE

## 2023-11-06 PROCEDURE — 90472 IMMUNIZATION ADMIN EACH ADD: CPT | Mod: S$GLB,,, | Performed by: FAMILY MEDICINE

## 2023-11-07 VITALS
SYSTOLIC BLOOD PRESSURE: 130 MMHG | RESPIRATION RATE: 17 BRPM | TEMPERATURE: 99 F | DIASTOLIC BLOOD PRESSURE: 78 MMHG | WEIGHT: 179.69 LBS | HEART RATE: 70 BPM | HEIGHT: 61 IN | BODY MASS INDEX: 33.93 KG/M2 | OXYGEN SATURATION: 97 %

## 2023-11-08 NOTE — PROGRESS NOTES
Subjective     Patient ID: Elodia Serrano is a 68 y.o. female.    Chief Complaint: Hypertension, Hyperlipidemia, Hypothyroidism, and Asthma    Patient presents here for 6 month follow-up of hypertension, hyperlipidemia, hypothyroidism, and asthma.  Her weight has increased 2 lb over the last 6 months and her BMI today is 33.95.  She has been under increased stress over the last 6 months as her son who she was helping to take care of his passed away and now she is take care of her mother who is living with her.  We did discuss stress reduction techniques and she does have some help from her brothers.  We did talk about the possibility of medication in the future but we both feel like she does not need that at this time and she is coping well.  Her hypertension is controlled at this time even though her blood pressure today is borderline at 140/81.  Repeat blood pressure shows that has come down to 130/78.  She is compliant with her medications and tolerating her medications well.  Her hyperlipidemia is well controlled with her present use of pravastatin 40 mg daily as well as her low-fat low-cholesterol diet.  She does have a history of asthma but has not had any problems with asthma in over a year and has not had to use her inhaler.  Hypothyroidism is stable with her present use of levothyroxine 25 mcg daily.  She did have lab work early in October and this was reviewed with her.  CMP is normal except for a borderline elevated glucose at 101 with upper limits of normal being 100.  TSH is therapeutic at 2.57; total cholesterol 203, HDL 51, , and triglycerides 252.  Hemoglobin A1c is 5.7% which is borderline elevated.  As far as health maintenance, she is up-to-date with all of her recommended screening exams and immunizations with the exception of COVID vaccine which she declines, shingles vaccine, RSV vaccine, 2nd pneumococcal vaccine, flu vaccine, and mammogram.        Review of Systems    Constitutional:  Negative for chills, fatigue, fever and unexpected weight change.   HENT:  Negative for nasal congestion, postnasal drip and sore throat.    Respiratory:  Negative for cough and shortness of breath.    Cardiovascular:  Negative for chest pain and palpitations.   Gastrointestinal:  Negative for abdominal pain, diarrhea, nausea and vomiting.   Musculoskeletal:  Negative for arthralgias and back pain.   Neurological:  Negative for dizziness, light-headedness and headaches.   Psychiatric/Behavioral:  Negative for sleep disturbance. The patient is not nervous/anxious.           Objective     Physical Exam  Vitals reviewed.   Constitutional:       General: She is not in acute distress.     Appearance: Normal appearance. She is well-developed. She is obese.   HENT:      Right Ear: Tympanic membrane and external ear normal.      Left Ear: Tympanic membrane and external ear normal.      Mouth/Throat:      Pharynx: Oropharynx is clear. No posterior oropharyngeal erythema.   Neck:      Thyroid: No thyromegaly.      Vascular: No carotid bruit.   Cardiovascular:      Rate and Rhythm: Normal rate and regular rhythm.      Pulses: Normal pulses.      Heart sounds: Normal heart sounds. No murmur heard.  Pulmonary:      Effort: Pulmonary effort is normal.      Breath sounds: Normal breath sounds. No wheezing or rales.   Musculoskeletal:         General: No tenderness. Normal range of motion.      Cervical back: Normal range of motion and neck supple.      Right lower leg: No edema.      Left lower leg: No edema.   Lymphadenopathy:      Cervical: No cervical adenopathy.   Neurological:      General: No focal deficit present.      Mental Status: She is alert and oriented to person, place, and time.      Cranial Nerves: No cranial nerve deficit.      Deep Tendon Reflexes: Reflexes are normal and symmetric.            Assessment and Plan     1. Primary hypertension    2. Mixed hyperlipidemia    3. Acquired  hypothyroidism    4. Asthma in remission    5. Flu vaccine need  -     Influenza (FLUAD) - Quadrivalent (Adjuvanted) *Preferred* (65+) (PF)    6. Need for vaccination  -     (In Office Administered) Pneumococcal Conjugate Vaccine (20 Valent) (IM) (Preferred)        1. Continue present medication as her hypertension, hyperlipidemia, hypothyroidism, and asthma are stable and controlled  2. Continue low-sodium, low-fat low-cholesterol diet and exercise.  BMI today is 33.95   3. High-dose flu vaccine today   4. Prevnar 20 today to complete pneumococcal prophylaxis  5. Follow up with me in 6 months or p.r.n.           Follow up in about 6 months (around 5/6/2024).

## 2023-11-16 ENCOUNTER — PATIENT OUTREACH (OUTPATIENT)
Dept: ADMINISTRATIVE | Facility: HOSPITAL | Age: 68
End: 2023-11-16
Payer: COMMERCIAL

## 2023-11-16 NOTE — PROGRESS NOTES
Population Health Chart Review & Patient Outreach Details    Outreach Performed: NO    Additional Pop Health Notes:           Updates Requested / Reviewed:      Updated Care Coordination Note, Care Everywhere, , and Immunizations Reconciliation Completed or Queried: [place]         Health Maintenance Topics Overdue:    Health Maintenance Due   Topic Date Due    COVID-19 Vaccine (1) Never done    Shingles Vaccine (1 of 2) Never done    RSV Vaccine (Age 60+) (1 - 1-dose 60+ series) Never done         Health Maintenance Topic(s) Outreach Outcomes & Actions Taken:    Breast Cancer Screening - Outreach Outcomes & Actions Taken  : External Records Uploaded & Care Team Updated if Applicable

## 2023-12-08 DIAGNOSIS — I10 ESSENTIAL HYPERTENSION: ICD-10-CM

## 2023-12-08 NOTE — TELEPHONE ENCOUNTER
No care due was identified.  [facility] Embedded Care Due Messages. Reference number: [id number].   12/08/2023 1:33:13 PM CST

## 2023-12-09 RX ORDER — OLMESARTAN MEDOXOMIL 20 MG/1
20 TABLET ORAL
Qty: 90 TABLET | Refills: 3 | Status: SHIPPED | OUTPATIENT
Start: 2023-12-09

## 2023-12-09 NOTE — TELEPHONE ENCOUNTER
Refill Decision Note   Elodia Serrano  is requesting a refill authorization.  Brief Assessment and Rationale for Refill:  Approve     Medication Therapy Plan:         Comments:     Note composed:11:21 AM 12/09/2023

## 2024-02-17 NOTE — TELEPHONE ENCOUNTER
----- Message from Bucky Loya sent at 5/4/2023  9:50 AM CDT -----      Name of Who is Calling:PT          What is the request in detail:PT is requesting medication be refilled on her behalf, PT states she only takes this medication when needed. Please be Advised!  albuterol (PROVENTIL/VENTOLIN HFA) 90 mcg/actuation inhaler (Discontinued) 1 Inhaler 11 10/3/2019 10/25/2020 No  Sig - Route: Inhale 2 puffs into the lungs every 6 (six) hours as needed (sob, wheezing). - Inhalation              Can the clinic reply by HolairaKAYDEN:no          What Number to Call Back if not in [phone]      
No care due was identified.  [facility] Embedded Care Due Messages. Reference number: [id number].   5/04/2023 10:07:35 AM CDT  
Refill Routing Note   Medication(s) are not appropriate for processing by [facility] for the following reason(s):      No active prescription written by PCP    [facility] action(s):  Defer     Medication Therapy Plan:  AS OF 10/06/22      Appointments  past 12m or future 3m with PCP    Date Provider   Last Visit   2023 Jerardo Hedrick Jr., MD   Next Visit   Visit date not found Jerardo Hedrick Jr., MD   ED visits in past 90 days: 0        Note composed:10:56 AM 2023          
Statement Selected

## 2024-05-20 ENCOUNTER — OFFICE VISIT (OUTPATIENT)
Dept: FAMILY MEDICINE | Facility: CLINIC | Age: 69
End: 2024-05-20
Payer: COMMERCIAL

## 2024-05-20 VITALS
DIASTOLIC BLOOD PRESSURE: 70 MMHG | OXYGEN SATURATION: 97 % | WEIGHT: 173.63 LBS | HEIGHT: 61 IN | BODY MASS INDEX: 32.78 KG/M2 | TEMPERATURE: 98 F | HEART RATE: 83 BPM | SYSTOLIC BLOOD PRESSURE: 126 MMHG

## 2024-05-20 DIAGNOSIS — E78.2 MIXED HYPERLIPIDEMIA: ICD-10-CM

## 2024-05-20 DIAGNOSIS — E03.9 ACQUIRED HYPOTHYROIDISM: ICD-10-CM

## 2024-05-20 DIAGNOSIS — J45.998 ASTHMA IN REMISSION: ICD-10-CM

## 2024-05-20 DIAGNOSIS — I10 PRIMARY HYPERTENSION: Primary | ICD-10-CM

## 2024-05-20 PROCEDURE — 99999 PR PBB SHADOW E&M-EST. PATIENT-LVL III: CPT | Mod: PBBFAC,,, | Performed by: FAMILY MEDICINE

## 2024-05-20 PROCEDURE — 99213 OFFICE O/P EST LOW 20 MIN: CPT | Mod: S$GLB,,, | Performed by: FAMILY MEDICINE

## 2024-05-20 RX ORDER — UBIDECARENONE 30 MG
100 CAPSULE ORAL DAILY
COMMUNITY

## 2024-05-22 NOTE — PROGRESS NOTES
Subjective     Patient ID: Elodia Serrano is a 68 y.o. female.    Chief Complaint: Hypertension, Hyperlipidemia, Asthma, and Hypothyroidism    Patient presents here for 6 month follow-up of hypertension, hyperlipidemia, hypothyroidism, and asthma.  Her weight has decreased 6 lb over the last 6 months and her BMI is down to 32.80.  Her hypertension is well controlled with her present medication and she is following her low-sodium diet.  Her blood pressure today is 126/70.  Her hyperlipidemia is controlled with her present use of pravastatin 40 mg daily as well as low-fat low-cholesterol diet.  Her hypothyroidism is controlled with her present use of levothyroxine 25 mcg daily.  She does have a history of asthma but has not really had any issues over the last several years and uses her albuterol only on an as-needed basis.  She has no new complaints at this time.  .  As far as health maintenance, she is up-to-date with all of her recommended screening exams and immunizations with the exception of shingles vaccine, RSV vaccine, and COVID vaccine.    Review of Systems   Constitutional:  Negative for chills, fatigue, fever and unexpected weight change.   HENT:  Negative for nasal congestion, ear pain, postnasal drip and sore throat.    Respiratory:  Negative for cough and shortness of breath.    Cardiovascular:  Negative for chest pain and palpitations.   Gastrointestinal:  Negative for abdominal pain, diarrhea and nausea.   Musculoskeletal:  Positive for arthralgias. Negative for back pain.   Neurological:  Negative for dizziness, light-headedness and headaches.   Psychiatric/Behavioral:  Negative for sleep disturbance. The patient is not nervous/anxious.           Objective     Physical Exam  Vitals reviewed.   Constitutional:       General: She is not in acute distress.     Appearance: Normal appearance. She is well-developed. She is obese.   HENT:      Right Ear: Tympanic membrane and external ear normal.       Left Ear: Tympanic membrane and external ear normal.      Mouth/Throat:      Pharynx: Oropharynx is clear. No posterior oropharyngeal erythema.   Neck:      Thyroid: No thyromegaly.      Vascular: No carotid bruit.   Cardiovascular:      Rate and Rhythm: Normal rate and regular rhythm.      Pulses: Normal pulses.      Heart sounds: Normal heart sounds. No murmur heard.  Pulmonary:      Effort: Pulmonary effort is normal.      Breath sounds: Normal breath sounds. No wheezing or rales.   Musculoskeletal:         General: No tenderness. Normal range of motion.      Cervical back: Normal range of motion and neck supple.      Right lower leg: No edema.      Left lower leg: No edema.   Lymphadenopathy:      Cervical: No cervical adenopathy.   Neurological:      General: No focal deficit present.      Mental Status: She is alert and oriented to person, place, and time.      Cranial Nerves: No cranial nerve deficit.      Deep Tendon Reflexes: Reflexes are normal and symmetric.          Assessment and Plan     1. Primary hypertension    2. Mixed hyperlipidemia    3. Acquired hypothyroidism    4. Asthma in remission        1. Continue present medication as her hypertension, hyperlipidemia, hypothyroidism, and asthma are stable   2.  Continue low-sodium, low-fat low-cholesterol diet and exercise.  BMI today is 32.80  3. Follow-up in 6 months or p.r.n.          Follow up in about 6 months (around 11/20/2024).

## 2024-07-25 DIAGNOSIS — E03.9 ACQUIRED HYPOTHYROIDISM: ICD-10-CM

## 2024-07-25 RX ORDER — LEVOTHYROXINE SODIUM 25 UG/1
TABLET ORAL
Qty: 90 TABLET | Refills: 0 | Status: SHIPPED | OUTPATIENT
Start: 2024-07-25

## 2024-07-25 NOTE — TELEPHONE ENCOUNTER
Provider Staff:  Action required for this patient    Requires labs      Please see care gap opportunities below in Care Due Message.    Thanks!  [facility]     Appointments      Date Provider   Last Visit   5/20/2024 Jerardo Hedrick Jr., MD   Next Visit   Visit date not found Jerardo Hedrick Jr., MD     Refill Decision Note   Elodia Serrano  is requesting a refill authorization.  Brief Assessment and Rationale for Refill:  Approve     Medication Therapy Plan:         Comments:     Note composed:8:46 AM 07/25/2024

## 2024-07-25 NOTE — TELEPHONE ENCOUNTER
Care Due:                  Date            Visit Type   Department     Provider  --------------------------------------------------------------------------------                                EP -                              PRIMARY      [facility]  Last Visit: 05-      CARE ([facility])   PRACTICE       Jerardo Hedrick  Next Visit: None Scheduled  None         None Found                                                            Last  Test          Frequency    Reason                     Performed    Due Date  --------------------------------------------------------------------------------    CMP.........  12 months..  olmesartan, pravastatin..  10-   10-    Lipid Panel.  12 months..  pravastatin..............  10-   10-    [employer] Embedded Care Due Messages. Reference number: [id number].   7/25/2024 12:29:51 AM CDT

## 2024-09-26 DIAGNOSIS — E78.2 MIXED HYPERLIPIDEMIA: ICD-10-CM

## 2024-09-26 NOTE — TELEPHONE ENCOUNTER
----- Message from Brooke Goldman sent at 9/26/2024 10:08 AM CDT -----  Regarding: refill  Contact: patient  Type:  RX Refill Request    Who Called:  patient   Refill or New Rx:  refill  RX Name and Strength:    pravastatin (PRAVACHOL) 40 MG tablet    How is the patient currently taking it? (ex. 1XDay):    Is this a 30 day or 90 day RX:    Preferred Pharmacy with phone number:    [facility]/pharmacy #[id number] - [place], [place] - [address] [facility]  [address]  [place] [address]  Phone: [phone] Fax: [phone]      Local or Mail Order:    Ordering Provider:    Juan Diego Call Back Number:  [phone]    Additional Information:  contact once sent thanks

## 2024-09-26 NOTE — TELEPHONE ENCOUNTER
Care Due:                  Date            Visit Type   Department     Provider  --------------------------------------------------------------------------------                                EP -                              PRIMARY      [facility]  Last Visit: 05-      CARE ([facility])   PRACTICE       Jerardo Hedrick  Next Visit: None Scheduled  None         None Found                                                            Last  Test          Frequency    Reason                     Performed    Due Date  --------------------------------------------------------------------------------    CMP.........  12 months..  olmesartan, pravastatin..  10-   10-    Lipid Panel.  12 months..  pravastatin..............  10-   10-    [employer] Embedded Care Due Messages. Reference number: [id number].   9/26/2024 10:14:15 AM CDT

## 2024-09-26 NOTE — TELEPHONE ENCOUNTER
Refill Routing Note   Medication(s) are not appropriate for processing by [facility] for the following reason(s):        Due for refill >6 months ago    [facility] action(s):  Defer     Requires labs : Yes             Appointments  past 12m or future 3m with PCP    Date Provider   Last Visit   5/20/2024 Jerardo Hedrick Jr., MD   Next Visit   Visit date not found Jerardo Hedrick Jr., MD   ED visits in past 90 days: 0        Note composed:5:01 PM 09/26/2024

## 2024-09-28 RX ORDER — PRAVASTATIN SODIUM 40 MG/1
40 TABLET ORAL DAILY
Qty: 90 TABLET | Refills: 3 | Status: SHIPPED | OUTPATIENT
Start: 2024-09-28

## 2024-10-27 DIAGNOSIS — E03.9 ACQUIRED HYPOTHYROIDISM: ICD-10-CM

## 2024-10-28 RX ORDER — LEVOTHYROXINE SODIUM 25 UG/1
TABLET ORAL
Qty: 90 TABLET | Refills: 3 | Status: SHIPPED | OUTPATIENT
Start: 2024-10-28

## 2024-12-15 DIAGNOSIS — I10 ESSENTIAL HYPERTENSION: ICD-10-CM

## 2024-12-15 NOTE — TELEPHONE ENCOUNTER
Care Due:                  Date            Visit Type   Department     Provider  --------------------------------------------------------------------------------                                EP -                              PRIMARY      [facility]  Last Visit: 05-      CARE ([facility])   PRACTICE       Jerardo Hedrick  Next Visit: None Scheduled  None         None Found                                                            Last  Test          Frequency    Reason                     Performed    Due Date  --------------------------------------------------------------------------------    CMP.........  12 months..  olmesartan, pravastatin..  10-   10-    Lipid Panel.  12 months..  pravastatin..............  10-   10-    [employer] Embedded Care Due Messages. Reference number: [id number].   12/15/2024 9:08:07 AM CST

## 2024-12-16 RX ORDER — OLMESARTAN MEDOXOMIL 20 MG/1
20 TABLET ORAL
Qty: 90 TABLET | Refills: 3 | Status: SHIPPED | OUTPATIENT
Start: 2024-12-16

## 2024-12-16 NOTE — TELEPHONE ENCOUNTER
Refill Routing Note   Medication(s) are not appropriate for processing by [facility] for the following reason(s):        Required labs outdated    [facility] action(s):  Defer     Requires labs : Yes             Appointments  past 12m or future 3m with PCP    Date Provider   Last Visit   5/20/2024 Jerardo Hedrick Jr., MD   Next Visit   Visit date not found Jerardo Hedrick Jr., MD   ED visits in past 90 days: 0        Note composed:2:25 PM 12/16/2024

## 2025-01-20 ENCOUNTER — OFFICE VISIT (OUTPATIENT)
Dept: URGENT CARE | Facility: CLINIC | Age: 70
End: 2025-01-20
Payer: COMMERCIAL

## 2025-01-20 VITALS
HEIGHT: 61 IN | WEIGHT: 173 LBS | RESPIRATION RATE: 16 BRPM | HEART RATE: 76 BPM | DIASTOLIC BLOOD PRESSURE: 79 MMHG | OXYGEN SATURATION: 95 % | SYSTOLIC BLOOD PRESSURE: 137 MMHG | BODY MASS INDEX: 32.66 KG/M2 | TEMPERATURE: 98 F

## 2025-01-20 DIAGNOSIS — J10.1 INFLUENZA A: Primary | ICD-10-CM

## 2025-01-20 DIAGNOSIS — R05.9 COUGH, UNSPECIFIED TYPE: ICD-10-CM

## 2025-01-20 DIAGNOSIS — Z20.822 COVID-19 VIRUS NOT DETECTED: ICD-10-CM

## 2025-01-20 LAB
CTP QC/QA: YES
CTP QC/QA: YES
FLUAV AG NPH QL: POSITIVE
FLUBV AG NPH QL: NEGATIVE
SARS-COV-2 AG RESP QL IA.RAPID: NEGATIVE

## 2025-01-20 PROCEDURE — 87804 INFLUENZA ASSAY W/OPTIC: CPT | Mod: QW,,, | Performed by: NURSE PRACTITIONER

## 2025-01-20 PROCEDURE — 99214 OFFICE O/P EST MOD 30 MIN: CPT | Mod: S$GLB,,, | Performed by: NURSE PRACTITIONER

## 2025-01-20 PROCEDURE — 87811 SARS-COV-2 COVID19 W/OPTIC: CPT | Mod: QW,S$GLB,, | Performed by: NURSE PRACTITIONER

## 2025-01-20 RX ORDER — AZELASTINE 1 MG/ML
1 SPRAY, METERED NASAL 2 TIMES DAILY PRN
Qty: 30 ML | Refills: 0 | Status: SHIPPED | OUTPATIENT
Start: 2025-01-20 | End: 2026-01-20

## 2025-01-20 RX ORDER — GUAIFENESIN 400 MG/1
400 TABLET ORAL EVERY 4 HOURS PRN
Qty: 24 TABLET | Refills: 0 | Status: SHIPPED | OUTPATIENT
Start: 2025-01-20 | End: 2025-01-30

## 2025-01-20 RX ORDER — CHLOPHEDIANOL HCL AND PYRILAMINE MALEATE 12.5; 12.5 MG/5ML; MG/5ML
10 SOLUTION ORAL EVERY 8 HOURS PRN
Qty: 150 ML | Refills: 0 | Status: SHIPPED | OUTPATIENT
Start: 2025-01-20 | End: 2025-01-25

## 2025-01-20 NOTE — PATIENT INSTRUCTIONS
Increase clear fluid intake  Rest until better  Stop all current over the counter cough, cold, flu medicine  Start Tamiflu immediately.  Tylenol/motrin otc for fever or pain-you may alternate these every 4 hours as needed for close control of fever and body aches  Start Astelin nasal spray as needed for sinus congestion and pressure.  Take Mucinex as needed for chest congestion and coughing. Take mucinex with a full glass of water at each dose  May take Ninja Cof as needed for rest or excessive cough.  Saltwater gargles 4 x daily and benzocaine anesthetic throat lozenges for sore throat. May also add honey based cough syrup  Follow up with PCP  Go immediately to the nearest emergency room for shortness of breath, chest pain,  or other emergent concern.  Return to clinic for new, worse, or unresolving symptoms

## 2025-01-20 NOTE — PROGRESS NOTES
"Subjective:      Patient ID: Elodia Serrano is a 69 y.o. female.    Vitals:  height is 5' 1" (1.549 m) and weight is 78.5 kg (173 lb). Her oral temperature is 98.4 °F (36.9 °C). Her blood pressure is 137/79 and her pulse is 76. Her respiration is 16 and oxygen saturation is 95%.     Chief Complaint: Cough    69-year-old female seen today for sinus congestion, cough, and sore throat.  Symptoms began 5 days ago.  She states she has been taking over-the-counter products and slowly getting better.  She denies any fever today    Cough  This is a new problem. Episode onset: x 3-4 days. The problem has been unchanged. The cough is Productive of sputum. Associated symptoms include headaches, postnasal drip and a sore throat. Pertinent negatives include no chest pain, chills, ear pain, fever, myalgias, rash or shortness of breath. Associated symptoms comments: Body aches   . She has tried OTC cough suppressant for the symptoms. The treatment provided mild relief.       Constitution: Negative for chills and fever.   HENT:  Positive for congestion, postnasal drip and sore throat. Negative for ear pain.    Neck: Negative for painful lymph nodes.   Cardiovascular:  Negative for chest pain, palpitations and sob on exertion.   Respiratory:  Positive for cough. Negative for sputum production and shortness of breath.    Gastrointestinal:  Negative for nausea and vomiting.   Musculoskeletal:  Negative for muscle ache.   Skin:  Negative for rash.   Neurological:  Positive for headaches. Negative for dizziness, light-headedness, passing out, disorientation and altered mental status.   Hematologic/Lymphatic: Negative for swollen lymph nodes.   Psychiatric/Behavioral:  Negative for altered mental status, disorientation and confusion.       Objective:     Physical Exam   Constitutional: She is oriented to person, place, and time. She appears well-developed. She is cooperative.  Non-toxic appearance. She does not appear ill. No " distress.   HENT:   Head: Normocephalic and atraumatic.   Ears:   Right Ear: Hearing and external ear normal.   Left Ear: Hearing and external ear normal.   Nose: Rhinorrhea and congestion present. No mucosal edema or nasal deformity. No epistaxis. Right sinus exhibits no maxillary sinus tenderness and no frontal sinus tenderness. Left sinus exhibits no maxillary sinus tenderness and no frontal sinus tenderness.   Mouth/Throat: Uvula is midline, oropharynx is clear and moist and mucous membranes are normal. Mucous membranes are moist. No trismus in the jaw. Normal dentition. No uvula swelling. No oropharyngeal exudate, posterior oropharyngeal edema, posterior oropharyngeal erythema, tonsillar abscesses or cobblestoning. Tonsils are 1+ on the right. Tonsils are 1+ on the left. No tonsillar exudate.   Eyes: Conjunctivae and lids are normal. No scleral icterus.   Neck: Trachea normal and phonation normal. Neck supple. No edema present. No erythema present. No neck rigidity present.   Cardiovascular: Normal rate, regular rhythm, normal heart sounds and normal pulses.   Pulmonary/Chest: Effort normal and breath sounds normal. No stridor. No respiratory distress. She has no decreased breath sounds. She has no rhonchi.   Abdominal: Normal appearance.   Musculoskeletal: Normal range of motion.         General: No deformity. Normal range of motion.   Lymphadenopathy:     She has no cervical adenopathy.   Neurological: no focal deficit. She is alert and oriented to person, place, and time. She exhibits normal muscle tone. Coordination normal.   Skin: Skin is warm, dry, intact, not diaphoretic and not pale. Capillary refill takes 2 to 3 seconds.   Psychiatric: Her speech is normal and behavior is normal. Judgment and thought content normal.   Nursing note and vitals reviewed.      Assessment:     1. Influenza A    2. Cough, unspecified type    3. COVID-19 virus not detected        Plan:       Influenza A  -     azelastine  (ASTELIN) 137 mcg (0.1 %) nasal spray; 1 spray (137 mcg total) by Nasal route 2 (two) times daily as needed for Rhinitis.  Dispense: 30 mL; Refill: 0  -     guaiFENesin (HUMIBID E) 400 mg Tab; Take 1 tablet (400 mg total) by mouth every 4 (four) hours as needed (Cough and congestion).  Dispense: 24 tablet; Refill: 0  -     benzocaine-menthoL 6-10 mg lozenge; Take 1 lozenge by mouth every 2 (two) hours as needed (Sore Throat).  Dispense: 18 tablet; Refill: 0  -     pyrilamine-chlophedianoL (NINJACOF) 12.5-12.5 mg/5 mL Liqd; Take 10 mLs by mouth every 8 (eight) hours as needed (cough/sinus congestion).  Dispense: 150 mL; Refill: 0    Cough, unspecified type  -     SARS Coronavirus 2 Antigen, POCT Manual Read  -     POCT Influenza A/B    COVID-19 virus not detected      The test results and physical exam findings were discussed with the patient and all questions answered. We discussed symptom monitoring, conservative care methods, medication use, and follow up orders. she verbalized understanding and agreement with the plan of care.

## 2025-03-17 ENCOUNTER — HOSPITAL ENCOUNTER (OUTPATIENT)
Dept: RADIOLOGY | Facility: HOSPITAL | Age: 70
Discharge: HOME | End: 2025-03-17
Attending: SPECIALIST
Payer: COMMERCIAL

## 2025-03-17 DIAGNOSIS — N20.1 URETERAL STONE: ICD-10-CM

## 2025-03-17 DIAGNOSIS — N20.1 URETERAL STONE: Primary | ICD-10-CM

## 2025-03-17 PROCEDURE — 74018 RADEX ABDOMEN 1 VIEW: CPT | Mod: TC,PO

## 2025-03-17 PROCEDURE — 74018 RADEX ABDOMEN 1 VIEW: CPT | Mod: 26,,, | Performed by: RADIOLOGY

## 2025-03-24 DIAGNOSIS — N20.1 CALCULUS OF URETER: Primary | ICD-10-CM

## 2025-04-11 ENCOUNTER — HOSPITAL ENCOUNTER (OUTPATIENT)
Dept: RADIOLOGY | Facility: HOSPITAL | Age: 70
Discharge: HOME | End: 2025-04-11
Attending: SPECIALIST
Payer: COMMERCIAL

## 2025-04-11 DIAGNOSIS — N20.1 CALCULUS OF URETER: ICD-10-CM

## 2025-04-11 PROCEDURE — 25500020 PHARM REV CODE 255: Performed by: SPECIALIST

## 2025-04-11 PROCEDURE — 74400 UROGRAPHY IV +-KUB TOMOG: CPT | Mod: TC

## 2025-04-11 PROCEDURE — 74400 UROGRAPHY IV +-KUB TOMOG: CPT | Mod: 26,,, | Performed by: RADIOLOGY

## 2025-04-11 RX ADMIN — IOHEXOL 100 ML: 300 INJECTION, SOLUTION INTRAVENOUS at 12:04

## 2025-06-12 NOTE — PROGRESS NOTES
[facility]  Office Visit Note     SUBJECTIVE:   HPI: Elodia Serrano  is a 69 y.o. female  here to est care    Medical conditions:  Asthma, HTN, HLD, hypothyroidism    Meds:  Albuterol, coQ10, levothyroxine 25 mcg daily, olmesartan 20 mg, pravastatin 40 mg, vit A, vit D    Due:  Labs   Mammo  DEXA scan     History of Present Illness    CHIEF COMPLAINT:  Patient presents today to est care     WEIGHT MANAGEMENT:  She reports weight fluctuating between 173-174 lbs on home scale. Previous attempts with [employer] resulted in initial 5-10 pound loss followed by plateau. Her diet includes minimal sweets but frequent salty foods, particularly portioned potato chips. She dines at restaurants on weekends and denies fast food consumption.    MEDICAL CONDITIONS:  She has hypothyroidism managed with levothyroxine and prediabetes based on previous A1C testing. She recently underwent eye surgery with Dr. Larios for removal of a blood vessel growing toward the eye.    MUSCULOSKELETAL:  She reports neck pain attributed to nighttime phone use for social media, currently receiving chiropractic treatment.    SOCIAL HISTORY:  She denies alcohol consumption, smoking, vaping, and illicit drug use.        Lab Visit on 04/11/2025   Component Date Value Ref Range Status    BUN 04/11/2025 13  8 - 23 mg/dL Final    Creatinine 04/11/2025 0.7  0.5 - 1.4 mg/dL Final    eGFR 04/11/2025 >60  >60 mL/min/1.73/m2 Final   Office Visit on 01/20/2025   Component Date Value Ref Range Status    SARS Coronavirus 2 Antigen 01/20/2025 Negative  Negative Final     Acceptable 01/20/2025 Yes   Final    Rapid Influenza A Ag 01/20/2025 Positive (A)  Negative Final    Rapid Influenza B Ag 01/20/2025 Negative  Negative Final     Acceptable 01/20/2025 Yes   Final         Medications Ordered Prior to Encounter[1]  Past Medical History:   Diagnosis Date    Hypertension      History reviewed. No pertinent  "surgical history.  History reviewed. No pertinent surgical history.  Family History   Problem Relation Name Age of Onset    Arthritis Mother      COPD Father         Marital Status:   Alcohol History:  reports current alcohol use.  Tobacco History:  reports that she has quit smoking. Her smoking use included cigarettes. She has a 33 pack-year smoking history. She has never used smokeless tobacco.  Drug History:  reports no history of drug use.    Health Maintenance Topics with due status: Not Due       Topic Last Completion Date    Colorectal Cancer Screening 09/25/2015    TETANUS VACCINE 01/03/2017    Hemoglobin A1c (Diabetic Prevention Screening) 10/05/2023    Influenza Vaccine 11/06/2023     Immunization History   Administered Date(s) Administered    Influenza 12/02/2009, 09/27/2013, 10/01/2015, 08/30/2019    Influenza (FLUAD) - Quadrivalent - Adjuvanted - PF *Preferred* (65+) 11/06/2023    Influenza - Quadrivalent - MDCK 08/30/2019    Influenza - Quadrivalent - MDCK - PF 08/30/2019    Influenza - Quadrivalent - PF *Preferred* (6 months and older) 01/03/2017, 02/15/2018, 08/30/2019    Influenza A (H1N1) 2009 Monovalent - IM 12/02/2009    PPD Test 02/06/2018, 06/01/2020    Pneumococcal Conjugate - 20 Valent 11/06/2023    Pneumococcal Polysaccharide - 23 Valent 08/17/2020    Tdap 01/03/2017       Review of patient's allergies indicates:   Allergen Reactions    No known drug allergies      Current Medications[2]    Review of Systems   Constitutional:  Negative for chills and fever.   Respiratory:  Negative for shortness of breath.    Cardiovascular:  Negative for chest pain.   Gastrointestinal:  Negative for abdominal pain, nausea and vomiting.       OBJECTIVE:      Vitals:    06/13/25 1006   BP: 126/76   BP Location: Left arm   Patient Position: Sitting   Pulse: 67   SpO2: 97%   Weight: 80.6 kg (177 lb 11.1 oz)   Height: 5' 1" (1.549 m)     Physical Exam  Constitutional:       General: She is not in acute " distress.     Appearance: She is obese. She is not ill-appearing or toxic-appearing.   HENT:      Head: Normocephalic and atraumatic.      Mouth/Throat:      Mouth: Mucous membranes are moist.      Pharynx: Uvula midline. No pharyngeal swelling.   Cardiovascular:      Rate and Rhythm: Normal rate and regular rhythm.   Pulmonary:      Effort: Pulmonary effort is normal. No tachypnea, bradypnea, accessory muscle usage, prolonged expiration or respiratory distress.      Breath sounds: Normal breath sounds. No stridor. No wheezing, rhonchi or rales.   Abdominal:      General: Bowel sounds are normal. There is no distension.      Palpations: Abdomen is soft.      Tenderness: There is no abdominal tenderness. There is no guarding or rebound.   Neurological:      General: No focal deficit present.      Mental Status: She is alert.   Psychiatric:         Mood and Affect: Mood normal.         Behavior: Behavior normal.        Assessment:       1. Routine general medical examination at a health care facility    2. Essential hypertension    3. Acquired hypothyroidism    4. Mixed hyperlipidemia    5. Encounter for screening mammogram for malignant neoplasm of breast    6. Asymptomatic menopausal state    7. Screening for diabetes mellitus (DM)    8. Vitamin D deficiency    9. Prediabetes    10. BMI 33.0-33.9,adult    11. Encounter for weight management        Plan:       Routine general medical examination at a health care facility  -     CBC Auto Differential; Future; Expected date: 06/13/2025  -     Comprehensive Metabolic Panel; Future; Expected date: 06/13/2025  - Discussed shingles vaccination and highlighted potential risks of not receiving it.  - Ordered CBC, CMP, lipid panel, and electrolytes to check cholesterol, blood count, liver and renal function.  - Patient to complete ordered lab work at [facility] within the next week.    Essential hypertension  -     CBC Auto Differential; Future; Expected date:  06/13/2025  -     Comprehensive Metabolic Panel; Future; Expected date: 06/13/2025  - BP stable  - Continue with olmesartan     Acquired hypothyroidism  -     TSH; Future; Expected date: 06/13/2025  - Patient is currently on levothyroxine 25 mcg for hypothyroidism.  - Thyroid function has not been checked recently and current dosage may be insufficient, potentially contributing to weight management challenges.  - Ordered thyroid function tests to reassess status and will adjust medication dosage if necessary based on results.  - Explained the relationship between hypothyroidism and weight management difficulties to patient.    Mixed hyperlipidemia  -     Lipid Panel; Future; Expected date: 06/13/2025  - Continue with pravastatin     Encounter for screening mammogram for malignant neoplasm of breast  -     Mammo Digital Screening Bilat w/ Kulwinder (XPD); Future; Expected date: 06/13/2025    Asymptomatic menopausal state  -     DXA Bone Density Axial Skeleton 1 or more sites; Future; Expected date: 06/13/2025    Vitamin D deficiency  -     Vitamin D; Future; Expected date: 06/13/2025    Prediabetes  - Previous A1C results were in the prediabetes range.  - Ordered comprehensive lab work including diabetes check to assess current status and prevent progression to diabetes.  - Educated patient on the impact of carbohydrates on glucose levels and weight gain.  - Advised on optimal eating sequence (vegetables, protein, then carbohydrates) to manage glucose spikes.    BMI 33.0-33.9,adult  Encounter for weight management  - Assessed current lifestyle habits and weight management efforts.  - Explained risks associated with medical weight loss options, including potential for weight regain and loss of muscle mass.  - Recommend: joining a gym and increasing exercise frequency; implementing portion control strategies when dining out (setting aside 1/3 to 1/4 of meal before eating); reducing consumption of high-carb foods,  particularly potato chips; stopping eating before feeling completely full; and aiming for weight loss of 1-2 lbs per week.  - Reviewed dietary habits, particularly intake of salty snacks.    RETINAL DISORDER:  - Assessed patient's history of eye surgery for a retinal blood vessel.    CERVICALGIA:  - Noted patient attends chiropractic treatment for cervical issues.    FOLLOW-UP:  - Follow up in 6 months (around December).  - Patient to contact the office with any questions before the next appointment.         Jennifer Herrera M.D.  6/21/2025    This note was created using [employer] voice recognition software that occasionally misinterprets phrases or words            [1]   Current Outpatient Medications on File Prior to Visit   Medication Sig Dispense Refill    albuterol (PROVENTIL/VENTOLIN HFA) 90 mcg/actuation inhaler Inhale 2 puffs into the lungs every 6 (six) hours as needed (sob, wheezing). 18 g 11    ASTAXANTHIN ORAL Take 12 mg by mouth.      azelastine (ASTELIN) 137 mcg (0.1 %) nasal spray 1 spray (137 mcg total) by Nasal route 2 (two) times daily as needed for Rhinitis. 30 mL 0    calcium citrate-vitamin D3 315-200 mg (CITRACAL+D) 315 mg-5 mcg (200 unit) per tablet Take 1 tablet by mouth 2 (two) times daily.      co-enzyme Q-10 30 mg capsule Take 100 mg by mouth once daily.      KRILL OIL ORAL Take by mouth.      levothyroxine (SYNTHROID) 25 MCG tablet TAKE 1 TABLET BY MOUTH EVERY DAY BEFORE BREAKFAST 90 tablet 3    multivitamin (THERAGRAN) per tablet Take 1 tablet by mouth once daily. Women over 50 vitamin      olmesartan (BENICAR) 20 MG tablet TAKE 1 TABLET BY MOUTH EVERY DAY 90 tablet 3    pravastatin (PRAVACHOL) 40 MG tablet Take 1 tablet (40 mg total) by mouth once daily. 90 tablet 3    vit A/vit C/vit E/zinc/copper (ICAPS AREDS ORAL) Take by mouth.      vitamin D (VITAMIN D3) 1000 units Tab Take 1,000 Units by mouth once daily.       No current facility-administered medications on file prior to visit.   [2]    Current Outpatient Medications:     albuterol (PROVENTIL/VENTOLIN HFA) 90 mcg/actuation inhaler, Inhale 2 puffs into the lungs every 6 (six) hours as needed (sob, wheezing)., Disp: 18 g, Rfl: 11    ASTAXANTHIN ORAL, Take 12 mg by mouth., Disp: , Rfl:     azelastine (ASTELIN) 137 mcg (0.1 %) nasal spray, 1 spray (137 mcg total) by Nasal route 2 (two) times daily as needed for Rhinitis., Disp: 30 mL, Rfl: 0    calcium citrate-vitamin D3 315-200 mg (CITRACAL+D) 315 mg-5 mcg (200 unit) per tablet, Take 1 tablet by mouth 2 (two) times daily., Disp: , Rfl:     co-enzyme Q-10 30 mg capsule, Take 100 mg by mouth once daily., Disp: , Rfl:     KRILL OIL ORAL, Take by mouth., Disp: , Rfl:     levothyroxine (SYNTHROID) 25 MCG tablet, TAKE 1 TABLET BY MOUTH EVERY DAY BEFORE BREAKFAST, Disp: 90 tablet, Rfl: 3    multivitamin (THERAGRAN) per tablet, Take 1 tablet by mouth once daily. Women over 50 vitamin, Disp: , Rfl:     olmesartan (BENICAR) 20 MG tablet, TAKE 1 TABLET BY MOUTH EVERY DAY, Disp: 90 tablet, Rfl: 3    pravastatin (PRAVACHOL) 40 MG tablet, Take 1 tablet (40 mg total) by mouth once daily., Disp: 90 tablet, Rfl: 3    vit A/vit C/vit E/zinc/copper (ICAPS AREDS ORAL), Take by mouth., Disp: , Rfl:     vitamin D (VITAMIN D3) 1000 units Tab, Take 1,000 Units by mouth once daily., Disp: , Rfl:

## 2025-06-13 ENCOUNTER — OFFICE VISIT (OUTPATIENT)
Dept: FAMILY MEDICINE | Facility: CLINIC | Age: 70
End: 2025-06-13
Payer: COMMERCIAL

## 2025-06-13 VITALS
HEART RATE: 67 BPM | OXYGEN SATURATION: 97 % | SYSTOLIC BLOOD PRESSURE: 126 MMHG | BODY MASS INDEX: 33.55 KG/M2 | DIASTOLIC BLOOD PRESSURE: 76 MMHG | HEIGHT: 61 IN | WEIGHT: 177.69 LBS

## 2025-06-13 DIAGNOSIS — Z78.0 ASYMPTOMATIC MENOPAUSAL STATE: ICD-10-CM

## 2025-06-13 DIAGNOSIS — Z76.89 ENCOUNTER FOR WEIGHT MANAGEMENT: ICD-10-CM

## 2025-06-13 DIAGNOSIS — E03.9 ACQUIRED HYPOTHYROIDISM: ICD-10-CM

## 2025-06-13 DIAGNOSIS — E78.2 MIXED HYPERLIPIDEMIA: ICD-10-CM

## 2025-06-13 DIAGNOSIS — Z12.31 ENCOUNTER FOR SCREENING MAMMOGRAM FOR MALIGNANT NEOPLASM OF BREAST: ICD-10-CM

## 2025-06-13 DIAGNOSIS — R73.03 PREDIABETES: ICD-10-CM

## 2025-06-13 DIAGNOSIS — Z00.00 ROUTINE GENERAL MEDICAL EXAMINATION AT A HEALTH CARE FACILITY: Primary | ICD-10-CM

## 2025-06-13 DIAGNOSIS — E55.9 VITAMIN D DEFICIENCY: ICD-10-CM

## 2025-06-13 DIAGNOSIS — I10 ESSENTIAL HYPERTENSION: ICD-10-CM

## 2025-06-13 PROCEDURE — 99397 PER PM REEVAL EST PAT 65+ YR: CPT | Mod: S$GLB,,, | Performed by: STUDENT IN AN ORGANIZED HEALTH CARE EDUCATION/TRAINING PROGRAM

## 2025-06-13 PROCEDURE — 99999 PR PBB SHADOW E&M-EST. PATIENT-LVL V: CPT | Mod: PBBFAC,,, | Performed by: STUDENT IN AN ORGANIZED HEALTH CARE EDUCATION/TRAINING PROGRAM

## 2025-06-13 RX ORDER — BUTALB/ACETAMINOPHEN/CAFFEINE 50-325-40
1 TABLET ORAL 2 TIMES DAILY
COMMUNITY

## 2025-07-18 ENCOUNTER — OFFICE VISIT (OUTPATIENT)
Dept: PULMONOLOGY | Facility: CLINIC | Age: 70
End: 2025-07-18
Payer: COMMERCIAL

## 2025-07-18 VITALS
BODY MASS INDEX: 33.04 KG/M2 | WEIGHT: 175 LBS | OXYGEN SATURATION: 92 % | DIASTOLIC BLOOD PRESSURE: 80 MMHG | SYSTOLIC BLOOD PRESSURE: 120 MMHG | HEART RATE: 85 BPM | HEIGHT: 61 IN

## 2025-07-18 DIAGNOSIS — G47.33 OBSTRUCTIVE SLEEP APNEA ON CPAP: Primary | ICD-10-CM

## 2025-07-18 DIAGNOSIS — E66.811 OBESITY (BMI 30.0-34.9): ICD-10-CM

## 2025-07-18 PROCEDURE — 99999 PR PBB SHADOW E&M-EST. PATIENT-LVL IV: CPT | Mod: PBBFAC,,, | Performed by: INTERNAL MEDICINE

## 2025-07-18 NOTE — PROGRESS NOTES
"  SUBJECTIVE:    Patient ID: Elodia Serrano is a 69 y.o. female.    Chief Complaint: Establish Care and Sleep Apnea    HPI The patient is here with ELIAS.  She has been followed by Dr. Nickerson and is doing very well with her CPAP.  Her machine is of uncertain age.  It works well.  She woore it 97% of the nights . She is set from 8-15.  Her max pressure is 12.  Her AHI is 2.6. She spends about 4'37" a night..  She uses a nasal mask.  Past Medical History:   Diagnosis Date    Hypertension      History reviewed. No pertinent surgical history.  Family History   Problem Relation Name Age of Onset    Arthritis Mother      COPD Father          Social History:   Marital Status:   Occupation: Data Unavailable  Alcohol History:  reports current alcohol use.  Tobacco History:  reports that she has quit smoking. Her smoking use included cigarettes. She has a 33 pack-year smoking history. She has never used smokeless tobacco.  Drug History:  reports no history of drug use.    Review of patient's allergies indicates:   Allergen Reactions    No known drug allergies        Current Medications[1]    Alpha-1 Antitrypsin:  Last PFT:   Last CT:    Review of Systems  General: Feeling Well.  Eyes: Vision is good with contacts.  ENT:  Hearing aides  Heart:: No chest pain or palpitations.  Lungs: No cough, sputum, or wheezing.  GI: No Nausea, vomiting, constipation, diarrhea, or reflux.  : Nocturia 1-2.  Musculoskeletal: No joint pain or myalgias.  Skin: No lesions or rashes.  Neuro: No headaches or neuropathy.  Lymph: No edema or adenopathy.  Psych: No anxiety or depression.  Endo: No weight change.    OBJECTIVE:      /80 (BP Location: Right arm, Patient Position: Sitting)   Pulse 85   Ht 5' 1" (1.549 m)   Wt 79.4 kg (175 lb)   SpO2 (!) 92%   BMI 33.07 kg/m²     Physical Exam  GENERAL: Older patient in no distress.  HEENT: Pupils equal and reactive. Extraocular movements intact. Nose intact. Pharynx moist.  " Mallampati 4  NECK: Supple.  15 in  HEART: Regular rate and rhythm. No murmur or gallop auscultated.  LUNGS: Clear to auscultation and percussion. Lung excursion symmetrical. No change in fremitus. No adventitial noises.  ABDOMEN: Bowel sounds present. Non-tender, no masses palpated.  EXTREMITIES: Normal muscle tone and joint movement, no cyanosis or clubbing.   LYMPHATICS: No adenopathy palpated, no edema.  SKIN: Dry, intact, no lesions.   NEURO: Cranial nerves II-XII intact. Motor strength 5/5 bilaterally, upper and lower extremities.  PSYCH: Appropriate affect.    Assessment:       1. Obstructive sleep apnea on CPAP    2. Obesity (BMI 30.0-34.9)      The patient is completely compliant on her auto Pap.  She can not sleep without it.  She travels with it.  She keeps it clean and uses distilled water.  Patient is aware that weight loss would be helpful.  Plan:       Obstructive sleep apnea on CPAP    Obesity (BMI 30.0-34.9)         Follow up in about 1 year (around 7/18/2026).                [1]   Current Outpatient Medications   Medication Sig Dispense Refill    azelastine (ASTELIN) 137 mcg (0.1 %) nasal spray 1 spray (137 mcg total) by Nasal route 2 (two) times daily as needed for Rhinitis. 30 mL 0    calcium citrate-vitamin D3 315-200 mg (CITRACAL+D) 315 mg-5 mcg (200 unit) per tablet Take 1 tablet by mouth 2 (two) times daily.      co-enzyme Q-10 30 mg capsule Take 100 mg by mouth once daily.      KRILL OIL ORAL Take by mouth.      levothyroxine (SYNTHROID) 25 MCG tablet TAKE 1 TABLET BY MOUTH EVERY DAY BEFORE BREAKFAST 90 tablet 3    multivitamin (THERAGRAN) per tablet Take 1 tablet by mouth once daily. Women over 50 vitamin      olmesartan (BENICAR) 20 MG tablet TAKE 1 TABLET BY MOUTH EVERY DAY 90 tablet 3    pravastatin (PRAVACHOL) 40 MG tablet Take 1 tablet (40 mg total) by mouth once daily. 90 tablet 3    vit A/vit C/vit E/zinc/copper (ICAPS AREDS ORAL) Take by mouth.      vitamin D (VITAMIN D3) 1000 units  Tab Take 1,000 Units by mouth once daily.      albuterol (PROVENTIL/VENTOLIN HFA) 90 mcg/actuation inhaler Inhale 2 puffs into the lungs every 6 (six) hours as needed (sob, wheezing). 18 g 11    ASTAXANTHIN ORAL Take 12 mg by mouth.       No current facility-administered medications for this visit.

## 2025-08-26 DIAGNOSIS — M85.88 OTHER SPECIFIED DISORDERS OF BONE DENSITY AND STRUCTURE, OTHER SITE: ICD-10-CM

## 2025-08-26 DIAGNOSIS — N95.1 SYMPTOMATIC MENOPAUSAL OR FEMALE CLIMACTERIC STATES: Primary | ICD-10-CM

## 2025-09-05 ENCOUNTER — HOSPITAL ENCOUNTER (OUTPATIENT)
Dept: RADIOLOGY | Facility: HOSPITAL | Age: 70
Discharge: HOME | End: 2025-09-05
Attending: SPECIALIST
Payer: COMMERCIAL

## 2025-09-05 DIAGNOSIS — N95.1 SYMPTOMATIC MENOPAUSAL OR FEMALE CLIMACTERIC STATES: ICD-10-CM

## 2025-09-05 DIAGNOSIS — M85.88 OTHER SPECIFIED DISORDERS OF BONE DENSITY AND STRUCTURE, OTHER SITE: ICD-10-CM

## 2025-09-05 PROCEDURE — 77080 DXA BONE DENSITY AXIAL: CPT | Mod: TC,PO

## 2025-09-05 PROCEDURE — 77080 DXA BONE DENSITY AXIAL: CPT | Mod: 26,,, | Performed by: RADIOLOGY
